# Patient Record
Sex: MALE | Race: WHITE | NOT HISPANIC OR LATINO | Employment: OTHER | URBAN - METROPOLITAN AREA
[De-identification: names, ages, dates, MRNs, and addresses within clinical notes are randomized per-mention and may not be internally consistent; named-entity substitution may affect disease eponyms.]

---

## 2018-09-13 PROBLEM — N23 RENAL COLIC: Status: ACTIVE | Noted: 2018-09-13

## 2018-09-13 PROBLEM — R31.0 GROSS HEMATURIA: Status: ACTIVE | Noted: 2018-09-13

## 2018-09-13 PROBLEM — N20.0 CALCULUS OF KIDNEY: Status: ACTIVE | Noted: 2018-09-13

## 2018-09-13 PROBLEM — N20.1 CALCULUS OF URETER: Status: ACTIVE | Noted: 2018-09-13

## 2020-12-16 ENCOUNTER — HOSPITAL ENCOUNTER (EMERGENCY)
Facility: HOSPITAL | Age: 55
Discharge: HOME | End: 2020-12-16
Attending: EMERGENCY MEDICINE
Payer: OTHER MISCELLANEOUS

## 2020-12-16 VITALS
SYSTOLIC BLOOD PRESSURE: 127 MMHG | RESPIRATION RATE: 20 BRPM | OXYGEN SATURATION: 95 % | TEMPERATURE: 98 F | HEART RATE: 62 BPM | DIASTOLIC BLOOD PRESSURE: 75 MMHG

## 2020-12-16 DIAGNOSIS — S01.511A COMPLICATED LACERATION OF LIP, INITIAL ENCOUNTER: ICD-10-CM

## 2020-12-16 DIAGNOSIS — S03.2XXA TOOTH AVULSION, INITIAL ENCOUNTER: Primary | ICD-10-CM

## 2020-12-16 PROCEDURE — 99285 EMERGENCY DEPT VISIT HI MDM: CPT | Mod: ,,, | Performed by: EMERGENCY MEDICINE

## 2020-12-16 PROCEDURE — 25000003 PHARM REV CODE 250: Performed by: EMERGENCY MEDICINE

## 2020-12-16 PROCEDURE — 99285 PR EMERGENCY DEPT VISIT,LEVEL V: ICD-10-PCS | Mod: ,,, | Performed by: EMERGENCY MEDICINE

## 2020-12-16 PROCEDURE — 13152 CMPLX RPR E/N/E/L 2.6-7.5 CM: CPT

## 2020-12-16 PROCEDURE — 99283 EMERGENCY DEPT VISIT LOW MDM: CPT | Mod: 25

## 2020-12-16 RX ORDER — CHLORHEXIDINE GLUCONATE ORAL RINSE 1.2 MG/ML
15 SOLUTION DENTAL 2 TIMES DAILY
Qty: 420 ML | Refills: 0 | Status: SHIPPED | OUTPATIENT
Start: 2020-12-16 | End: 2020-12-30

## 2020-12-16 RX ORDER — HYDROCODONE BITARTRATE AND ACETAMINOPHEN 5; 325 MG/1; MG/1
1 TABLET ORAL EVERY 4 HOURS PRN
Qty: 18 TABLET | Refills: 0 | Status: SHIPPED | OUTPATIENT
Start: 2020-12-16 | End: 2020-12-19

## 2020-12-16 RX ORDER — HYDROCODONE BITARTRATE AND ACETAMINOPHEN 5; 325 MG/1; MG/1
1 TABLET ORAL EVERY 4 HOURS PRN
Qty: 18 TABLET | Refills: 0 | Status: SHIPPED | OUTPATIENT
Start: 2020-12-16 | End: 2020-12-16 | Stop reason: SDUPTHER

## 2020-12-16 RX ORDER — OXYCODONE AND ACETAMINOPHEN 5; 325 MG/1; MG/1
1 TABLET ORAL
Status: COMPLETED | OUTPATIENT
Start: 2020-12-16 | End: 2020-12-16

## 2020-12-16 RX ORDER — CEPHALEXIN 500 MG/1
500 CAPSULE ORAL 4 TIMES DAILY
Qty: 20 CAPSULE | Refills: 0 | Status: SHIPPED | OUTPATIENT
Start: 2020-12-16 | End: 2020-12-21

## 2020-12-16 RX ORDER — LIDOCAINE HYDROCHLORIDE 10 MG/ML
10 INJECTION INFILTRATION; PERINEURAL
Status: COMPLETED | OUTPATIENT
Start: 2020-12-16 | End: 2020-12-16

## 2020-12-16 RX ORDER — CHLORHEXIDINE GLUCONATE ORAL RINSE 1.2 MG/ML
15 SOLUTION DENTAL 2 TIMES DAILY
Qty: 420 ML | Refills: 0 | Status: SHIPPED | OUTPATIENT
Start: 2020-12-16 | End: 2020-12-16 | Stop reason: SDUPTHER

## 2020-12-16 RX ORDER — BACITRACIN ZINC 500 UNIT/G
OINTMENT (GRAM) TOPICAL 2 TIMES DAILY
Qty: 30 G | Refills: 0 | Status: SHIPPED | OUTPATIENT
Start: 2020-12-16 | End: 2023-08-29

## 2020-12-16 RX ORDER — AMOXICILLIN AND CLAVULANATE POTASSIUM 875; 125 MG/1; MG/1
1 TABLET, FILM COATED ORAL 2 TIMES DAILY
Qty: 14 TABLET | Refills: 0 | Status: SHIPPED | OUTPATIENT
Start: 2020-12-16 | End: 2020-12-16 | Stop reason: CLARIF

## 2020-12-16 RX ADMIN — LIDOCAINE HYDROCHLORIDE 10 ML: 10 INJECTION, SOLUTION INFILTRATION; PERINEURAL at 02:12

## 2020-12-16 RX ADMIN — OXYCODONE HYDROCHLORIDE AND ACETAMINOPHEN 1 TABLET: 5; 325 TABLET ORAL at 04:12

## 2020-12-16 NOTE — ED TRIAGE NOTES
"Patient presents to ER via [facility] from [facility] for plastic surgery evaluation for upper lip laceration. 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 I was sitting down so I didn't fall back but when I stood up my neck and my left shoulder and hip hurt really bad". Denies LOC at time of incident. + neck pains, denies active C-spine tenderness, + left sided hip pains, + left sided shoulder pains. Denies HA. Pt reports active pain 5/10 on pain scale after PO pain mediation from previous facility.  "

## 2020-12-16 NOTE — ED PROVIDER NOTES
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  This happened around 10 30 this morning.  He lost a tooth.  He denies any cough shortness of breath.  He has a headache and some pain to the right side of his neck.  No midline neck pain.  He denies any weakness or numbness to his arms or legs.  There was no loss of consciousness.  He was given a tetanus shot and a Percocet transferred here for Plastic surgery evaluation.        Review of patient's allergies indicates:  No Known Allergies  Past Medical History:   Diagnosis Date    High cholesterol     Hypertension     Kidney stones      Past Surgical History:   Procedure Laterality Date    CYSTOSCOPY W/ RETROGRADES Bilateral 9/13/2018    Procedure: CYSTOSCOPY WITH RETROGRADE PYELOGRAM;  Surgeon: Francisco Pena MD;  Location: [facility] OR;  Service: Urology;  Laterality: Bilateral;    LASER LITHOTRIPSY Left 9/13/2018    Procedure: LITHOTRIPSY-LASER;  Surgeon: Francisco Pena MD;  Location: [facility] OR;  Service: Urology;  Laterality: Left;    URETERAL STENT PLACEMENT Left 9/13/2018    Procedure: INSERTION, STENT, URETER;  Surgeon: Francisco Pena MD;  Location: [facility] OR;  Service: Urology;  Laterality: Left;    URETEROSCOPIC REMOVAL OF URETERIC CALCULUS Left 9/13/2018    Procedure: EXTRACTION-STONE-URETEROSCOPY WITH BASKET;  Surgeon: Francisco Pena MD;  Location: [facility] OR;  Service: Urology;  Laterality: Left;    URETEROSCOPY Left 9/13/2018    Procedure: URETEROSCOPY;  Surgeon: Francisco Pena MD;  Location: [facility] OR;  Service: Urology;  Laterality: Left;      History reviewed. No pertinent family history.  Social History     Tobacco Use    Smoking status: Never Smoker    Smokeless tobacco: Never Used   Substance Use Topics    Alcohol use: Never     Frequency: Never    Drug use: Never     Review of Systems   Constitutional: Negative for fever.   HENT: Negative for congestion.    Respiratory: Negative for shortness of breath.    Cardiovascular: Negative for chest pain.   Gastrointestinal: Negative for abdominal pain.       Physical Exam     Initial Vitals [12/16/20 1342]   BP Pulse Resp Temp SpO2   127/75 (!) 57 16 97.9 °F (36.6 °C) 99 %      MAP       --         Physical Exam    Constitutional: He appears well-developed and well-nourished. He is not diaphoretic. No distress.   HENT:   Right Ear: External ear normal.   Left Ear: External ear normal.   Nose: Nose normal.   Deep laceration (full thickness) to upper lip.  See photo    Dental fracture/avulsion at the gum level to front upper incisor   Eyes: EOM are normal. Pupils are equal, round, and reactive to light.   Neck: Normal range of motion. Neck supple. No JVD present.   Cardiovascular: Normal rate, regular rhythm and normal heart sounds. Exam reveals no friction rub.    Pulmonary/Chest: Breath sounds normal. No respiratory distress. He has no wheezes. He has no rales.   Abdominal: Soft. Bowel sounds are normal. He exhibits no distension. There is no abdominal tenderness.   Musculoskeletal: Normal range of motion.   Neurological: He is alert and oriented to person, place, and time. He has normal strength. No cranial nerve deficit or sensory deficit.             ED Course   Procedures  Labs Reviewed - No data to display       Imaging Results           CT Maxillofacial Without Contrast (Final result)  Result time 12/16/20 15:34:52    Final result by Kamron Segovia MD (12/16/20 15:34:52)                 Impression:      1. Acute midline upper lip open laceration with associated trauma/fracture involving the  1st maxillary tooth left of midline with destruction of the anterior maxilla in this region.  Recommend follow-up.  2. Minimal regularity of the nasal ala on the right could be chronic.  A minimal acute fracture is not completely excluded in this region.  3. Mild left maxillary sinus disease.  4.  This report was flagged in Epic as abnormal.      Electronically signed by: Kamron Cobian  Date:    12/16/2020  Time:    15:34             Narrative:    EXAMINATION:  CT MAXILLOFACIAL WITHOUT CONTRAST    CLINICAL HISTORY:  Facial trauma;    TECHNIQUE:  Low dose axial images, sagittal and coronal reformations were obtained through the face.  Contrast was not administered.    COMPARISON:  None    FINDINGS:  Midline upper lip laceration.  First maxillary tooth left of midline is missing with disruption of the anterior maxilla consistent with acute trauma/fracture.    Minimal irregularity of the nasal ala on the right could be chronic.  A minimal acute fracture is not completely excluded.    Superficial small hyperdense focus at or near the skin surface at the junction of the nose and face on the left could represent small chronic calcification.  Small foreign body is not completely excluded.  Similar though smaller high-density focus is seen slightly more lateral in the left face on axial 181 also.    Probable mucous retention cyst in the left maxillary sinus.  No significant paranasal sinus disease elsewhere.  Globes appear intact.    The orbits are intact.  Zygomatic arches are intact.  The mandible is intact.                               CT Head Without Contrast (Final result)  Result time 12/16/20 15:17:10    Final result by Chiki Montana Jr., MD (12/16/20 15:17:10)                 Impression:      Normal      Electronically signed by: Chiki Baumann Jr  Date:    12/16/2020  Time:    15:17             Narrative:    EXAMINATION:  CT HEAD WITHOUT CONTRAST    CLINICAL HISTORY:  Head trauma,  mod-severe;    TECHNIQUE:  Low dose axial images were obtained through the head.  Coronal and sagittal reformations were also performed. Contrast was not administered.    COMPARISON:  None.    FINDINGS:  The CSF spaces, brain parenchyma, and bony calvarium are intact.There is no evidence of hemorrhage, mass, or acute infarct.    The visualized paranasal sinuses are clear.                                 Medical Decision Making:   History:   Old Records Summarized: records from another hospital.       <> Summary of Records: Patient seen an outside ED and transferred here for further eval.  He got Percocet, tetanus and transferred for plastic  Initial Assessment:   Patient with complex lip laceration.  Will consult Plastic surgery.  Patient with facial trauma.  Will obtain CT.  He will need to be discharged on antibiotic and follow-up with a dentist for his tooth.  Clinical Tests:   Radiological Study: Ordered and Reviewed  ED Management:  CT of face and head reveals no acute fracture except for dental injury.  No sign of head bleed    Plastic surgery fellow and attending repaired laceration    Will discharge home on Keflex as antibiotic.  Peridex swish and spit.  Recommend follow-up with Plastic surgery.  Recommend follow-up with dentist as soon as possible.  Vicodin supply for 3 days.  Other:   I have discussed this case with another health care provider.       <> Summary of the Discussion: Consult discussed with plastics.  Review treatment plan.  They will evaluate                             Clinical Impression:     ICD-10-CM ICD-9-CM   1. Tooth avulsion, initial encounter  S03.2XXA 873.63   2. Complicated laceration of lip, initial encounter  S01.511A 873.53                          ED Disposition Condition    Discharge Stable        ED Prescriptions     Medication Sig Dispense Start Date End Date Auth. Provider    HYDROcodone-acetaminophen (NORCO) 5-325 mg per tablet  (Status: Discontinued) Take 1 tablet by  mouth every 4 (four) hours as needed for Pain. 18 tablet 12/16/2020 12/16/2020 Elías West MD    amoxicillin-clavulanate 875-125mg (AUGMENTIN) 875-125 mg per tablet  (Status: Discontinued) Take 1 tablet by mouth 2 (two) times daily. 14 tablet 12/16/2020 12/16/2020 Elías West MD    chlorhexidine (PERIDEX) 0.12 % solution  (Status: Discontinued) Use as directed 15 mLs in the mouth or throat 2 (two) times daily. for 14 days 420 mL 12/16/2020 12/16/2020 Elías West MD    cephALEXin (KEFLEX) 500 MG capsule Take 1 capsule (500 mg total) by mouth 4 (four) times daily. for 5 days 20 capsule 12/16/2020 12/21/2020 Elías West MD    bacitracin 500 unit/gram Oint Apply topically 2 (two) times daily. 30 g 12/16/2020  Elías West MD    chlorhexidine (PERIDEX) 0.12 % solution Use as directed 15 mLs in the mouth or throat 2 (two) times daily. for 14 days 420 mL 12/16/2020 12/30/2020 Elías West MD    HYDROcodone-acetaminophen (NORCO) 5-325 mg per tablet Take 1 tablet by mouth every 4 (four) hours as needed for Pain. 18 tablet 12/16/2020 12/19/2020 Elías West MD        Follow-up Information     Follow up With Specialties Details Why Contact Info    Dentist as soon as possible        [facility] PLASTIC SURGERY Plastic Surgery Schedule an appointment as soon as possible for a visit   [address] [address]  [phone]    [facility]  If symptoms worsen [address] [id number] [phone]                                       Elías West MD  12/16/20 [id number]

## 2020-12-16 NOTE — ED NOTES
Patient identifiers verified and correct for John Salcido.    LOC: The patient is awake, alert and aware of environment with an appropriate affect, the patient is oriented x 3 and speaking appropriately.  APPEARANCE: Patient resting comfortably and in no acute distress, patient is clean and well groomed, patient's clothing is properly fastened.   SKIN: The skin is warm and dry, color consistent with ethnicity, patient has normal skin turgor and moist mucus membranes, skin intact, no breakdown or bruising noted. + vertical laceration noted to upper lip, Bleeding controlled at this time.   MUSCULOSKELETAL: Patient moving all extremities spontaneously, no obvious swelling or deformities noted. Pt reports + left sided shoulder pains, + left sided hip pains, + neck pains, denies C-spine tenderness.   RESPIRATORY: Airway is open and patent, respirations are spontaneous, patient has a normal effort and rate, no accessory muscle use noted, bilateral breath sounds clear in all lobes.  CARDIAC: Patient has a normal rate and regular rhythm, no periphreal edema noted, capillary refill < 3 seconds.  ABDOMEN: Soft and non tender to palpation, no distention noted.  NEUROLOGIC: PERRL, 3 mm bilaterally, eyes open spontaneously, behavior appropriate to situation, follows commands, facial expression symmetrical, bilateral hand grasp equal and even, purposeful motor response noted, normal sensation in all extremities when touched with a finger.

## 2020-12-16 NOTE — ED NOTES
Patient moved to ED room 29 via [facility] from [facility] for plastic surgery consult, patient assisted onto stretcher and changed into a gown. Patient placed on cardiac monitor, continuous pulse oximetry and automatic blood pressure cuff. Bed placed in low locked position, side rails up x 2, call light is within reach of patient or family, orientation to room and explanation of wait provided to family and patient, alarms set and turned on for monitor and pulse ox, awaiting MD evaluation and orders, will continue to monitor.

## 2020-12-16 NOTE — ED NOTES
Pt requesting pain medication at this time, pain currently rated 7/10 on pain scale. MD made aware, awaiting further orders at this time.

## 2020-12-16 NOTE — CONSULTS
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 Patient reports loosing a teeth.  He denies any cough shortness of breath.  He has a headache and some pain to the right side of his neck.  No midline neck pain.  He denies any weakness or numbness to his arms or legs.  There was no loss of consciousness.     Current Facility-Administered Medications on File Prior to Encounter   Medication    [COMPLETED] oxyCODONE-acetaminophen 5-325 mg per tablet 1 tablet    [COMPLETED] Tdap vaccine injection 0.5 mL     Current Outpatient Medications on File Prior to Encounter   Medication Sig    allopurinol (ZYLOPRIM) 300 MG tablet Take 300 mg by mouth once daily.    metoprolol succinate (TOPROL-XL) 50 MG 24 hr tablet Take 50 mg by mouth 2 (two) times daily.    oxybutynin (DITROPAN) 5 MG Tab Take 5 mg by mouth 2 (two) times daily.    phenazopyridine (PYRIDIUM) 200 MG tablet Take 200 mg by mouth 3 (three) times daily with meals.    simvastatin (ZOCOR) 20 MG tablet Take 20 mg by mouth once daily.    tamsulosin (FLOMAX) 0.4 mg Cap Take 1 capsule (0.4 mg total) by mouth once daily.    [DISCONTINUED] HYDROcodone-acetaminophen (NORCO)  mg per tablet Take 1 tablet by mouth every 6 (six) hours as needed for Pain.    [DISCONTINUED] ondansetron (ZOFRAN-ODT) 4 MG TbDL TAKE 1 TABLET EVERY 6 HRS AS NEEDED FOR NAUSEA/VOMITING       Review of patient's allergies indicates:  No  Known Allergies    Past Medical History:   Diagnosis Date    High cholesterol     Hypertension     Kidney stones      Past Surgical History:   Procedure Laterality Date    CYSTOSCOPY W/ RETROGRADES Bilateral 9/13/2018    Procedure: CYSTOSCOPY WITH RETROGRADE PYELOGRAM;  Surgeon: Francisco Pena MD;  Location: [facility] OR;  Service: Urology;  Laterality: Bilateral;    LASER LITHOTRIPSY Left 9/13/2018    Procedure: LITHOTRIPSY-LASER;  Surgeon: Francisco Pena MD;  Location: [facility] OR;  Service: Urology;  Laterality: Left;    URETERAL STENT PLACEMENT Left 9/13/2018    Procedure: INSERTION, STENT, URETER;  Surgeon: Francisco Pena MD;  Location: [facility] OR;  Service: Urology;  Laterality: Left;    URETEROSCOPIC REMOVAL OF URETERIC CALCULUS Left 9/13/2018    Procedure: EXTRACTION-STONE-URETEROSCOPY WITH BASKET;  Surgeon: Francisco Pena MD;  Location: [facility] OR;  Service: Urology;  Laterality: Left;    URETEROSCOPY Left 9/13/2018    Procedure: URETEROSCOPY;  Surgeon: Francisco Pena MD;  Location: [facility] OR;  Service: Urology;  Laterality: Left;     Family History     None        Tobacco Use    Smoking status: Never Smoker    Smokeless tobacco: Never Used   Substance and Sexual Activity    Alcohol use: Never     Frequency: Never    Drug use: Never    Sexual activity: Not on file     Review of Systems   Constitutional: Negative for chills and fever.   HENT: Positive for dental problem. Negative for nosebleeds.         Upper lip laceration through and through   Eyes: Positive for pain.   Respiratory: Negative for chest tightness and shortness of breath.    Gastrointestinal: Negative for abdominal distention.   Genitourinary: Negative for flank pain.   Musculoskeletal: Negative for back pain, myalgias and neck pain.   Neurological: Positive for numbness.   Psychiatric/Behavioral: Negative for agitation and sleep disturbance.     Objective:     Vital Signs (Most Recent):  Temp: 97.9 °F (36.6 °C) (12/16/20 1342)  Pulse:  62 (12/16/20 1535)  Resp: 20 (12/16/20 1622)  BP: 127/75 (12/16/20 1342)  SpO2: 95 % (12/16/20 1535) Vital Signs (24h Range):  Temp:  [97.5 °F (36.4 °C)-97.9 °F (36.6 °C)] 97.9 °F (36.6 °C)  Pulse:  [57-78] 62  Resp:  [16-21] 20  SpO2:  [94 %-99 %] 95 %  BP: (127-164)/(75-93) 127/75        There is no height or weight on file to calculate BMI.    No intake or output data in the 24 hours ending 12/16/20 1648    Physical Exam  Constitutional:       Appearance: Normal appearance.   HENT:      Mouth/Throat:      Comments: Upper lip midline through and through laceration  Eyes:      Conjunctiva/sclera: Conjunctivae normal.   Neck:      Musculoskeletal: Neck supple.   Cardiovascular:      Rate and Rhythm: Regular rhythm.   Pulmonary:      Breath sounds: Normal breath sounds.   Abdominal:      General: Abdomen is flat.      Palpations: Abdomen is soft.   Musculoskeletal: Normal range of motion.   Skin:     General: Skin is warm and dry.   Neurological:      Mental Status: He is alert and oriented to person, place, and time.     Significant Labs:  BMP: No results for input(s): GLU, NA, K, CL, CO2, BUN, CREATININE, CALCIUM, MG in the last 48 hours.  CBC: No results for input(s): WBC, RBC, HGB, HCT, PLT, MCV, MCH, MCHC in the last 48 hours.    Significant Diagnostics:  CT: I have reviewed all pertinent results/findings within the past 24 hours.   Head - Normal  Face - Acute midline upper lip open laceration with associated trauma/fracture involving the 1st maxillary tooth left of midline with destruction of the anterior maxilla in this region.  Recommend follow-up.    Assessment/Plan:     55 year old M presenting with through and through laceration      -Bacitracin to upper lip three times a day  -Peridex swish and spit twice a day  -Continue home doxy course  -Follow up with Dr santos as needed      Laceration Repair Procedure Note    Pre-operative Diagnosis: 6 cm upper lip laceration    Post-operative Diagnosis:  normal    Indications: upper lip laceration through and through    Anesthesia: 1% plain lidocaine    Procedure Details   6 cm upper lip laceration through and through, washed out with betadine and saline. Upper lip lacerated in three pieces, with the middle one being only connected by a orbicularis edge. Orbicularis was approximated using a 4-0 vicryl. We then alligned the skin using 5-0 chromic.    Findings:  Wound edge debrieded to bleeding edges    EBL: 3 cc's    Drains: none    Condition:  Stable    Complications:  none.    Thank you for your consult. I will follow-up with patient. Please contact us if you have any additional questions.    Jan Zheng MD  Plastic Surgery  [facility]

## 2020-12-16 NOTE — ED NOTES
Procedure consents signed per plastics MD at bedside, signed per patient, labeled, witnessed and placed in pt's chart

## 2020-12-21 ENCOUNTER — TELEPHONE (OUTPATIENT)
Dept: PLASTIC SURGERY | Facility: CLINIC | Age: 55
End: 2020-12-21

## 2020-12-21 ENCOUNTER — OFFICE VISIT (OUTPATIENT)
Dept: PLASTIC SURGERY | Facility: CLINIC | Age: 55
End: 2020-12-21
Payer: COMMERCIAL

## 2020-12-21 DIAGNOSIS — S01.81XD FACIAL LACERATION, SUBSEQUENT ENCOUNTER: Primary | ICD-10-CM

## 2020-12-21 PROCEDURE — 99024 POSTOP FOLLOW-UP VISIT: CPT | Mod: 95,,, | Performed by: SURGERY

## 2020-12-21 PROCEDURE — 99024 PR POST-OP FOLLOW-UP VISIT: ICD-10-PCS | Mod: 95,,, | Performed by: SURGERY

## 2020-12-21 NOTE — PROGRESS NOTES
Established Patient - Audio Only Telehealth Visit     The patient location is: home  The chief complaint leading to consultation is: follow up facial laceration  Visit type: Virtual visit with audio only (telephone)  Total time spent with patient: 10 minutes       The reason for the audio only service rather than synchronous audio and video virtual visit was related to technical difficulties or patient preference/necessity.     Each patient to whom I provide medical services by telemedicine is:  (1) informed of the relationship between the physician and patient and the respective role of any other health care provider with respect to management of the patient; and (2) notified that they may decline to receive medical services by telemedicine and may withdraw from such care at any time. Patient verbally consented to receive this service via voice-only telephone call.       HPI:   Lip laceration follow up with large, nearly totally amputated lip tubercle segment     There is a 2-3 mm segment of thin eschar over the left aspect of the lip tubercle.    There is no dehiscence.  There is an intraoral step off at the mucosal side of the upper lip.      Assessment and plan:    Continue peridex rinses  Monitor for signs of foul drainage, dehiscence or other evidence of infection  Please follow up in 1 week.       This service was not originating from a related E/M service provided within the previous 7 days nor will  to an E/M service or procedure within the next 24 hours or my soonest available appointment.  Prevailing standard of care was able to be met in this audio-only visit.

## 2020-12-21 NOTE — TELEPHONE ENCOUNTER
----- Message from Karlie Hsu sent at 12/21/2020  9:46 AM CST -----  Regarding: speak with nurse  Contact: patient  [phone]    please call above patient was told to call and get a virtual visit scheduled for today waiting on a call back from the nurse thanks.

## 2020-12-28 ENCOUNTER — OFFICE VISIT (OUTPATIENT)
Dept: PLASTIC SURGERY | Facility: CLINIC | Age: 55
End: 2020-12-28
Payer: COMMERCIAL

## 2020-12-28 DIAGNOSIS — S01.81XD FACIAL LACERATION, SUBSEQUENT ENCOUNTER: Primary | ICD-10-CM

## 2020-12-28 PROCEDURE — 99024 POSTOP FOLLOW-UP VISIT: CPT | Mod: 95,,, | Performed by: SURGERY

## 2020-12-28 PROCEDURE — 99024 PR POST-OP FOLLOW-UP VISIT: ICD-10-PCS | Mod: 95,,, | Performed by: SURGERY

## 2020-12-28 NOTE — PROGRESS NOTES
Established Patient - Audio Only Telehealth Visit     The patient location is: Home  The chief complaint leading to consultation is: lip laceration repair  Visit type: Virtual visit with audio only (telephone)  Total time spent with patient: 10 min       The reason for the audio only service rather than synchronous audio and video virtual visit was related to technical difficulties or patient preference/necessity.     Each patient to whom I provide medical services by telemedicine is:  (1) informed of the relationship between the physician and patient and the respective role of any other health care provider with respect to management of the patient; and (2) notified that they may decline to receive medical services by telemedicine and may withdraw from such care at any time. Patient verbally consented to receive this service via voice-only telephone call.       HPI: History of lip laceration.  #9 was lost in the accident.  He has had a consultation with a dentist who plans on bridge, implant and crown.  They are waiting for bone to heal better    There is a pin head sized eschar over his left paramedian tubercle  Laceration well healed  Red lip, white roll well approximated     Assessment and plan:    Scar massage  Ongoing dental care  Scar care- high SPF sunscreen  Continue peridex rinses.       This service was not originating from a related E/M service provided within the previous 7 days nor will  to an E/M service or procedure within the next 24 hours or my soonest available appointment.  Prevailing standard of care was able to be met in this audio-only visit.      Plastic & Reconstructive Surgery  [facility]  c/o Venkat Osborne M.D.  [facility]  [address]  [address] [place] [address]    Work [phone]  Toll free [phone]  If no answer [phone]

## 2021-05-04 ENCOUNTER — PATIENT MESSAGE (OUTPATIENT)
Dept: RESEARCH | Facility: HOSPITAL | Age: 56
End: 2021-05-04

## 2023-07-05 NOTE — PROGRESS NOTES
DATE: 7/10/2023  PATIENT: John Salcido    Supervising Physician: Josh Peterson M.D.    CHIEF COMPLAINT: neck and bilateral arm pain    HISTORY:  John Salcido is a 57 y.o. male here for initial evaluation of neck and bilateral (R>L) arm pain (Neck - 3, Arm - 3).  The pain has been present for 3 years after an accident at work. 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 The patient describes the pain as aching in his neck and shooting down both arms (R>L). The pain is worse with activity and improved by nothing. There is positive associated numbness and tingling. There is positive subjective weakness. Prior treatments have included PT, chiropractic rx, ESIs and MBBs, but no surgery.     The patient also has complaints of low back and bilateral (L>R) leg pain (Back - 3, Leg - 3). The pain has been present for years. The patient describes the pain as severe pain in his left calf and radiating pain in both thighs.  The pain is worse with activity and improved by rest. There is positive associated numbness and tingling. There is negative subjective weakness. Prior treatments have included PT, but no ESIs or surgery. Pt says years ago he had a lumbar MRI that showed an schwannoma at L2 and he is supposed to monitor it but has not had a recent lumbar MRI.    The patient denies myelopathic symptoms such as handwriting changes or difficulty with buttons/coins/keys. Denies perineal paresthesias, bowel/bladder dysfunction.    PAST MEDICAL/SURGICAL HISTORY:  Past Medical History:   Diagnosis Date    High cholesterol     Hypertension     Kidney stones      Past Surgical History:   Procedure Laterality Date    CYSTOSCOPY W/ RETROGRADES Bilateral 9/13/2018    Procedure: CYSTOSCOPY WITH RETROGRADE PYELOGRAM;  Surgeon: Francisco Pena MD;  Location: [facility] OR;  Service: Urology;  Laterality: Bilateral;    LASER LITHOTRIPSY Left 9/13/2018    Procedure: LITHOTRIPSY-LASER;  Surgeon: Francisco MCGILL  MD Jean;  Location: [facility] OR;  Service: Urology;  Laterality: Left;    URETERAL STENT PLACEMENT Left 9/13/2018    Procedure: INSERTION, STENT, URETER;  Surgeon: Francisco Pena MD;  Location: [facility] OR;  Service: Urology;  Laterality: Left;    URETEROSCOPIC REMOVAL OF URETERIC CALCULUS Left 9/13/2018    Procedure: EXTRACTION-STONE-URETEROSCOPY WITH BASKET;  Surgeon: Francisco Pena MD;  Location: [facility] OR;  Service: Urology;  Laterality: Left;    URETEROSCOPY Left 9/13/2018    Procedure: URETEROSCOPY;  Surgeon: Francisco Pena MD;  Location: [facility] OR;  Service: Urology;  Laterality: Left;       Medications:   Current Outpatient Medications on File Prior to Visit   Medication Sig Dispense Refill    allopurinol (ZYLOPRIM) 300 MG tablet Take 300 mg by mouth once daily.      bacitracin 500 unit/gram Oint Apply topically 2 (two) times daily. 30 g 0    metoprolol succinate (TOPROL-XL) 50 MG 24 hr tablet Take 50 mg by mouth 2 (two) times daily.      oxybutynin (DITROPAN) 5 MG Tab Take 5 mg by mouth 2 (two) times daily.      phenazopyridine (PYRIDIUM) 200 MG tablet Take 200 mg by mouth 3 (three) times daily with meals.      simvastatin (ZOCOR) 20 MG tablet Take 20 mg by mouth once daily.      tamsulosin (FLOMAX) 0.4 mg Cap Take 1 capsule (0.4 mg total) by mouth once daily. 10 capsule 0     No current facility-administered medications on file prior to visit.       Social History:   Social History     Socioeconomic History    Marital status:    Tobacco Use    Smoking status: Never    Smokeless tobacco: Never   Substance and Sexual Activity    Alcohol use: Never    Drug use: Never       REVIEW OF SYSTEMS:  Constitution: Negative. Negative for chills, fever and night sweats.   Cardiovascular: Negative for chest pain and syncope.   Respiratory: Negative for cough and shortness of breath.   Gastrointestinal: See HPI. Negative for nausea/vomiting. Negative for abdominal pain.  Genitourinary: See HPI. Negative for  "discoloration or dysuria.  Skin: Negative for dry skin, itching and rash.   Hematologic/Lymphatic: Negative for bleeding problem. Does not bruise/bleed easily.   Musculoskeletal: Negative for falls and muscle weakness.   Neurological: See HPI. No seizures.   Endocrine: Negative for polydipsia, polyphagia and polyuria.   Allergic/Immunologic: Negative for hives and persistent infections.     EXAM:  Ht 5' 8" (1.727 m)   Wt 95.3 kg (210 lb 1.6 oz)   BMI 31.95 kg/m²     PHYSICAL EXAMINATION:    General: The patient is a very pleasant 57 y.o. male in no apparent distress, the patient is oriented to person, place and time.  Psych: Normal mood and affect  HEENT: Vision grossly intact, hearing intact to the spoken word.  Lungs: Respirations unlabored.  Gait: Normal station and gait, no difficulty with toe or heel walk.   Skin: Cervical skin and dorsal lumbar skin negative for rashes, lesions, hairy patches and surgical scars.    Range of motion: Cervical and lumbar range of motion is acceptable. There is mild tenderness to palpation of the paracervical muscles.  There is mild lumbar tenderness to palpation.  Spinal Balance: Global saggital and coronal spinal balance acceptable, no significant for scoliosis and kyphosis.  Musculoskeletal: No pain with the range of motion of the bilateral shoulders and elbows. Normal bulk and contour of the bilateral hands.  No pain with the range of motion of the bilateral hips. Mild bilateral trochanteric tenderness to palpation.  Vascular: Bilateral upper and lower extremities warm and well perfused, radial pulses 2+ bilaterally, dorsalis pedis pulses 2+ bilaterally.  Neurological: Normal strength and tone in all major motor groups in the bilateral upper and lower extremities. Normal sensation to light touch in the C5-T1 and L2-S1 dermatomes bilaterally.  Deep tendon reflexes symmetric 2+ in the bilateral upper and lower extremities.  Negative Inverted Radial Reflex and Vaughn's " bilaterally. Negative Babinski bilaterally. Negative straight leg raise bilaterally.     IMAGING:   Today I personally reviewed AP, Lat and Flex/Ex  upright C-spine films that demonstrate degenerative changes at C5-6.    MRI cervical (2020 outside facility) demonstrates mild bulging disc at C5-6      Body mass index is 31.95 kg/m².    No results found for: HGBA1C      ASSESSMENT/PLAN:    Diagnoses and all orders for this visit:    Localized swelling, mass and lump, unspecified  -     MRI Lumbar Spine W WO Contrast; Future    Cervical radiculopathy  -     MRI Cervical Spine Without Contrast; Future    Other orders  -     methylPREDNISolone (MEDROL DOSEPACK) 4 mg tablet; use as directed        Today we discussed at length all of the different treatment options including anti-inflammatories, acetaminophen, rest, ice, heat, physical therapy including strengthening and stretching exercises, home exercises, ROM, aerobic conditioning, aqua therapy, other modalities including ultrasound, massage, and dry needling, epidural steroid injections and finally surgical intervention.      Pt presents with chronic worsening neck pain and cervical radiculopathy. Failure of conservative rx. Will obtain MRI to further evaluate and call with results.    Also presents with chronic lumbar radiculopathy and hx of L2 schwannoma. Will obtain MRI to monitor.

## 2023-07-07 ENCOUNTER — HOSPITAL ENCOUNTER (OUTPATIENT)
Dept: RADIOLOGY | Facility: HOSPITAL | Age: 58
Discharge: HOME | End: 2023-07-07
Attending: ORTHOPAEDIC SURGERY
Payer: COMMERCIAL

## 2023-07-07 ENCOUNTER — OFFICE VISIT (OUTPATIENT)
Dept: ORTHOPEDICS | Facility: CLINIC | Age: 58
End: 2023-07-07
Payer: COMMERCIAL

## 2023-07-07 VITALS — BODY MASS INDEX: 31.85 KG/M2 | HEIGHT: 68 IN | WEIGHT: 210.13 LBS

## 2023-07-07 DIAGNOSIS — M54.12 CERVICAL RADICULOPATHY: ICD-10-CM

## 2023-07-07 DIAGNOSIS — R22.9 LOCALIZED SWELLING, MASS AND LUMP, UNSPECIFIED: Primary | ICD-10-CM

## 2023-07-07 DIAGNOSIS — M50.30 DDD (DEGENERATIVE DISC DISEASE), CERVICAL: ICD-10-CM

## 2023-07-07 PROCEDURE — 3008F BODY MASS INDEX DOCD: CPT | Mod: CPTII,S$GLB,, | Performed by: ORTHOPAEDIC SURGERY

## 2023-07-07 PROCEDURE — 3008F PR BODY MASS INDEX (BMI) DOCUMENTED: ICD-10-PCS | Mod: CPTII,S$GLB,, | Performed by: ORTHOPAEDIC SURGERY

## 2023-07-07 PROCEDURE — 1159F PR MEDICATION LIST DOCUMENTED IN MEDICAL RECORD: ICD-10-PCS | Mod: CPTII,S$GLB,, | Performed by: ORTHOPAEDIC SURGERY

## 2023-07-07 PROCEDURE — 1159F MED LIST DOCD IN RCRD: CPT | Mod: CPTII,S$GLB,, | Performed by: ORTHOPAEDIC SURGERY

## 2023-07-07 PROCEDURE — 99999 PR PBB SHADOW E&M-EST. PATIENT-LVL III: ICD-10-PCS | Mod: PBBFAC,,, | Performed by: ORTHOPAEDIC SURGERY

## 2023-07-07 PROCEDURE — 72050 X-RAY EXAM NECK SPINE 4/5VWS: CPT | Mod: TC

## 2023-07-07 PROCEDURE — 99999 PR PBB SHADOW E&M-EST. PATIENT-LVL III: CPT | Mod: PBBFAC,,, | Performed by: ORTHOPAEDIC SURGERY

## 2023-07-07 PROCEDURE — 72050 X-RAY EXAM NECK SPINE 4/5VWS: CPT | Mod: 26,,, | Performed by: RADIOLOGY

## 2023-07-07 PROCEDURE — 99204 OFFICE O/P NEW MOD 45 MIN: CPT | Mod: S$GLB,,, | Performed by: ORTHOPAEDIC SURGERY

## 2023-07-07 PROCEDURE — 99204 PR OFFICE/OUTPT VISIT, NEW, LEVL IV, 45-59 MIN: ICD-10-PCS | Mod: S$GLB,,, | Performed by: ORTHOPAEDIC SURGERY

## 2023-07-07 PROCEDURE — 72050 XR CERVICAL SPINE AP LAT WITH FLEX EXTEN: ICD-10-PCS | Mod: 26,,, | Performed by: RADIOLOGY

## 2023-07-07 RX ORDER — METHYLPREDNISOLONE 4 MG/1
TABLET ORAL
Qty: 1 EACH | Refills: 0 | Status: SHIPPED | OUTPATIENT
Start: 2023-07-07 | End: 2023-07-28

## 2023-07-14 ENCOUNTER — HOSPITAL ENCOUNTER (OUTPATIENT)
Dept: RADIOLOGY | Facility: HOSPITAL | Age: 58
Discharge: HOME | End: 2023-07-14
Attending: ORTHOPAEDIC SURGERY
Payer: COMMERCIAL

## 2023-07-14 DIAGNOSIS — M54.12 CERVICAL RADICULOPATHY: ICD-10-CM

## 2023-07-14 DIAGNOSIS — R22.9 LOCALIZED SWELLING, MASS AND LUMP, UNSPECIFIED: ICD-10-CM

## 2023-07-14 PROCEDURE — 72141 MRI CERVICAL SPINE WITHOUT CONTRAST: ICD-10-PCS | Mod: 26,,, | Performed by: INTERNAL MEDICINE

## 2023-07-14 PROCEDURE — A9585 GADOBUTROL INJECTION: HCPCS | Performed by: ORTHOPAEDIC SURGERY

## 2023-07-14 PROCEDURE — 72141 MRI NECK SPINE W/O DYE: CPT | Mod: TC

## 2023-07-14 PROCEDURE — 72158 MRI LUMBAR SPINE W WO CONTRAST: ICD-10-PCS | Mod: 26,,, | Performed by: INTERNAL MEDICINE

## 2023-07-14 PROCEDURE — 25500020 PHARM REV CODE 255: Performed by: ORTHOPAEDIC SURGERY

## 2023-07-14 PROCEDURE — 72158 MRI LUMBAR SPINE W/O & W/DYE: CPT | Mod: 26,,, | Performed by: INTERNAL MEDICINE

## 2023-07-14 PROCEDURE — 72141 MRI NECK SPINE W/O DYE: CPT | Mod: 26,,, | Performed by: INTERNAL MEDICINE

## 2023-07-14 PROCEDURE — 72158 MRI LUMBAR SPINE W/O & W/DYE: CPT | Mod: TC

## 2023-07-14 RX ORDER — GADOBUTROL 604.72 MG/ML
9 INJECTION INTRAVENOUS
Status: COMPLETED | OUTPATIENT
Start: 2023-07-14 | End: 2023-07-14

## 2023-07-14 RX ADMIN — GADOBUTROL 9 ML: 604.72 INJECTION INTRAVENOUS at 10:07

## 2023-07-25 NOTE — PROGRESS NOTES
Established Patient - Audio Only Telehealth Visit     The patient location is: home  The chief complaint leading to consultation is: MRI results  Visit type: Virtual visit with audio only (telephone)  Total time spent with patient: 10 min       The reason for the audio only service rather than synchronous audio and video virtual visit was related to technical difficulties or patient preference/necessity.     Each patient to whom I provide medical services by telemedicine is:  (1) informed of the relationship between the physician and patient and the respective role of any other health care provider with respect to management of the patient; and (2) notified that they may decline to receive medical services by telemedicine and may withdraw from such care at any time. Patient verbally consented to receive this service via voice-only telephone call.    DATE: 7/25/2023  PATIENT: John Salcido    Attending Physician: Josh Peterson MD    HISTORY:  John Salcido is a 57 y.o. male who returns to me today for MRI results.  He was last seen by me 7/7/2023.  Today he is doing well but notes he continues to have neck, bilateral arm, low back, and bilateral leg pain.    The Patient denies myelopathic symptoms such as handwriting changes or difficulty with buttons/coins/keys. Denies perineal paresthesias, bowel/bladder dysfunction.    PMH/PSH/FamHx/SocHx:  Unchanged from prior visit    ROS:  REVIEW OF SYSTEMS:  Constitution: Negative. Negative for chills, fever and night sweats.   HENT: Negative for congestion and headaches.    Eyes: Negative for blurred vision, left vision loss and right vision loss.   Cardiovascular: Negative for chest pain and syncope.   Respiratory: Negative for cough and shortness of breath.    Endocrine: Negative for polydipsia, polyphagia and polyuria.   Hematologic/Lymphatic: Negative for bleeding problem. Does not bruise/bleed easily.   Skin: Negative for dry skin, itching and rash.   Musculoskeletal:  Negative for falls and muscle weakness.   Gastrointestinal: Negative for abdominal pain and bowel incontinence.   Allergic/Immunologic: Negative for hives and persistent infections.  Genitourinary: Negative for urinary retention/incontinence and nocturia.   Neurological: negative for disturbances in coordination, no myelopathic symptoms such as handwriting changes or difficulty with buttons, coins, keys or small objects. No loss of balance and seizures.   Psychiatric/Behavioral: Negative for depression. The patient does not have insomnia.   Denies perineal paresthesias, bowel or bladder incontinence    EXAM:  There were no vitals taken for this visit.    My physical examination was notable for the following findings:     Musculoskeletal and neuro exam stable      IMAGING:    Today I personally re- reviewed AP, Lat and Flex/Ex  upright C-spine films that demonstrate degenerative changes at C5-6.    MRI cervical demonstrates mild disc osteophyte complex at C5-6.    MRI lumbar demonstrates unchanged 1.2 x 0.8 x 0.6 cm intradural lesion at the L2 level (8:10 and 7:22).  No convincing enhancement. Stability favors a benign process such as a myxopapillary ependymoma, schwannoma, or meningioma. Multilevel degenerative changes, most advanced at L4-5 where there is degenerative grade 1 anterolisthesis resulting in mild canal stenosis and moderate bilateral foraminal stenosis.    There is no height or weight on file to calculate BMI.    No results found for: HGBA1C      ASSESSMENT/PLAN:    There are no diagnoses linked to this encounter.    Today we discussed at length all of the different treatment options including anti-inflammatories, acetaminophen, rest, ice, heat, physical therapy including strengthening and stretching exercises, home exercises, ROM, aerobic conditioning, aqua therapy, other modalities including ultrasound, massage, and dry needling, epidural steroid injections and finally surgical intervention.      Pt  presents with chronic cervical and lumbar radiculopathy. Will obtain EMG BUE to further evaluate and call with results.                        This service was not originating from a related E/M service provided within the previous 7 days nor will  to an E/M service or procedure within the next 24 hours or my soonest available appointment.  Prevailing standard of care was able to be met in this audio-only visit.

## 2023-07-27 ENCOUNTER — OFFICE VISIT (OUTPATIENT)
Dept: ORTHOPEDICS | Facility: CLINIC | Age: 58
End: 2023-07-27
Payer: COMMERCIAL

## 2023-07-27 ENCOUNTER — PATIENT MESSAGE (OUTPATIENT)
Dept: ORTHOPEDICS | Facility: CLINIC | Age: 58
End: 2023-07-27

## 2023-07-27 DIAGNOSIS — M54.12 CERVICAL RADICULOPATHY: Primary | ICD-10-CM

## 2023-07-27 PROCEDURE — 99213 OFFICE O/P EST LOW 20 MIN: CPT | Mod: 95,,, | Performed by: ORTHOPAEDIC SURGERY

## 2023-07-27 PROCEDURE — 99213 PR OFFICE/OUTPT VISIT, EST, LEVL III, 20-29 MIN: ICD-10-PCS | Mod: 95,,, | Performed by: ORTHOPAEDIC SURGERY

## 2023-08-01 ENCOUNTER — PROCEDURE VISIT (OUTPATIENT)
Dept: NEUROLOGY | Facility: CLINIC | Age: 58
End: 2023-08-01
Payer: COMMERCIAL

## 2023-08-01 DIAGNOSIS — M79.602 PAIN IN BOTH UPPER EXTREMITIES: Primary | ICD-10-CM

## 2023-08-01 DIAGNOSIS — M79.601 PAIN IN BOTH UPPER EXTREMITIES: Primary | ICD-10-CM

## 2023-08-01 DIAGNOSIS — M54.12 CERVICAL RADICULOPATHY: ICD-10-CM

## 2023-08-01 DIAGNOSIS — R20.0 NUMBNESS OF UPPER LIMB: ICD-10-CM

## 2023-08-01 PROCEDURE — 99203 PR OFFICE/OUTPT VISIT, NEW, LEVL III, 30-44 MIN: ICD-10-PCS | Mod: 25,S$GLB,, | Performed by: PSYCHIATRY & NEUROLOGY

## 2023-08-01 PROCEDURE — 95886 MUSC TEST DONE W/N TEST COMP: CPT | Mod: S$GLB,,, | Performed by: PSYCHIATRY & NEUROLOGY

## 2023-08-01 PROCEDURE — 95913 NRV CNDJ TEST 13/> STUDIES: CPT | Mod: S$GLB,,, | Performed by: PSYCHIATRY & NEUROLOGY

## 2023-08-01 PROCEDURE — 99203 OFFICE O/P NEW LOW 30 MIN: CPT | Mod: 25,S$GLB,, | Performed by: PSYCHIATRY & NEUROLOGY

## 2023-08-01 PROCEDURE — 95913 PR NERVE CONDUCTION STUDY; 13 OR MORE STUDIES: ICD-10-PCS | Mod: S$GLB,,, | Performed by: PSYCHIATRY & NEUROLOGY

## 2023-08-01 PROCEDURE — 95886 PR EMG COMPLETE, W/ NERVE CONDUCTION STUDIES, 5+ MUSCLES: ICD-10-PCS | Mod: S$GLB,,, | Performed by: PSYCHIATRY & NEUROLOGY

## 2023-08-01 NOTE — PROGRESS NOTES
[facility] [address] Neurology    Subjective:       Patient ID: John Salcido is a 57 y.o. male here for a EMG focused evaluation for neck and arm pain. Previous visits and diagnostic evaluation reviewed.  Patient states he was involved in an accident approximately 3 years ago with subsequent neck pain.  Neck pain radiating down bilateral arms.  He did begin to experience numbness of bilateral hands.  Symptoms have been slightly improved over the past few months.   HPI  Review of patient's allergies indicates:  No Known Allergies   There were no vitals filed for this visit.   Chief Complaint: No chief complaint on file.    Past Medical History:   Diagnosis Date    High cholesterol     Hypertension     Kidney stones       Social History     Socioeconomic History    Marital status:    Tobacco Use    Smoking status: Never    Smokeless tobacco: Never   Substance and Sexual Activity    Alcohol use: Never    Drug use: Never      Review of Systems:   No Fever  No SOB  No vomiting  No visual disturbance      Objective:      Physical Exam    Constitutional: Patient appears well-nourished.   Head: Normocephalic and atraumatic.   Mouth/Throat: Oropharynx is clear and moist.   Pulmonary/Chest: Effort normal.   Abdominal: Soft.   Skin: Skin is warm and dry.      General:  Patient is alert and cooperative.  Affect:  Patient is appropriate to surroundings and environment.  Language:  Speech is fluent.  HEENT:  There are no outward signs of trauma to head or face.  Cranial Nerves:  Pupils are equal round and reactive to light. Extra-ocular movements are intact. Face, tongue, and palate are symmetrical.  Motor:  Patient exhibits normal strength testing in bilateral proximal and distal upper extremities.  Reflexes:  Symmetrical in bilateral upper extremities.  Gait:  Ambulation is independent without use of cane or walker without signs of ataxia or  circumduction.  Cerebellar:  Normal finger to nose testing without dysmetria.  Sensory:  Intact to sensory modalities tested.  Musculoskeletal:  There is no severe tenderness to palpation and manipulation of the cervical spine region.   Assessment:       We reviewed and discussed at length results of EMG performed today revealing moderate to severe bilateral carpal tunnel syndrome worse on the right without significant evidence of cervical radiculopathy. These findings are available via media section of chart review.   1. Pain in both upper extremities    2. Cervical radiculopathy    3. Numbness of upper limb              Plan:       We discussed treatment options at length. Recommend patient keep appointment with referring provider.       We specifically discussed the pathophysiology of carpal tunnel syndrome and treatment options including bracing, injections and possible surgical intervention.     I spent a total of 35 minutes on the day of the visit. This includes face to face time and non-face to face time preparing to see the patient (eg, review of tests), obtaining and/or reviewing separately obtained history, documenting clinical information in the electronic or other health record, independently interpreting results and communicating results to the patient/family/caregiver, or care coordinator  .  Shay Piper MD, FAAN   08/01/2023   11:35 AM     A dictation device was used to produce this document. Use of such devices sometimes results in grammatical errors or replacement of words that sound similarly.

## 2023-08-17 ENCOUNTER — OFFICE VISIT (OUTPATIENT)
Dept: ORTHOPEDICS | Facility: CLINIC | Age: 58
End: 2023-08-17
Payer: COMMERCIAL

## 2023-08-17 DIAGNOSIS — M51.36 DDD (DEGENERATIVE DISC DISEASE), LUMBAR: ICD-10-CM

## 2023-08-17 DIAGNOSIS — M54.12 CERVICAL RADICULOPATHY: Primary | ICD-10-CM

## 2023-08-17 PROCEDURE — 99213 PR OFFICE/OUTPT VISIT, EST, LEVL III, 20-29 MIN: ICD-10-PCS | Mod: 95,,, | Performed by: ORTHOPAEDIC SURGERY

## 2023-08-17 PROCEDURE — 99213 OFFICE O/P EST LOW 20 MIN: CPT | Mod: 95,,, | Performed by: ORTHOPAEDIC SURGERY

## 2023-08-17 NOTE — PROGRESS NOTES
Established Patient - Audio Only Telehealth Visit     The patient location is: home home  The chief complaint leading to consultation is: EMG results  Visit type: Virtual visit with audio only (telephone)  Total time spent with patient: 10 min       The reason for the audio only service rather than synchronous audio and video virtual visit was related to technical difficulties or patient preference/necessity.     Each patient to whom I provide medical services by telemedicine is:  (1) informed of the relationship between the physician and patient and the respective role of any other health care provider with respect to management of the patient; and (2) notified that they may decline to receive medical services by telemedicine and may withdraw from such care at any time. Patient verbally consented to receive this service via voice-only telephone call.    DATE: 8/17/2023  PATIENT: John Salcido    Attending Physician: Josh Peterson MD    HISTORY:  John Salcido is a 57 y.o. male who returns to me today for EMG results.  He was last seen by me 7/27/2023.  Today he is doing well but notes neck, bilateral arm, low back, and bilateral leg pain..    The Patient denies myelopathic symptoms such as handwriting changes or difficulty with buttons/coins/keys. Denies perineal paresthesias, bowel/bladder dysfunction.    PMH/PSH/FamHx/SocHx:  Unchanged from prior visit    ROS:  REVIEW OF SYSTEMS:  Constitution: Negative. Negative for chills, fever and night sweats.   HENT: Negative for congestion and headaches.    Eyes: Negative for blurred vision, left vision loss and right vision loss.   Cardiovascular: Negative for chest pain and syncope.   Respiratory: Negative for cough and shortness of breath.    Endocrine: Negative for polydipsia, polyphagia and polyuria.   Hematologic/Lymphatic: Negative for bleeding problem. Does not bruise/bleed easily.   Skin: Negative for dry skin, itching and rash.   Musculoskeletal: Negative for falls  "and muscle weakness.   Gastrointestinal: Negative for abdominal pain and bowel incontinence.   Allergic/Immunologic: Negative for hives and persistent infections.  Genitourinary: Negative for urinary retention/incontinence and nocturia.   Neurological: negative for disturbances in coordination, no myelopathic symptoms such as handwriting changes or difficulty with buttons, coins, keys or small objects. No loss of balance and seizures.   Psychiatric/Behavioral: Negative for depression. The patient does not have insomnia.   Denies myelopathic symptoms, perineal paresthesias, bowel or bladder incontinence    EXAM:  There were no vitals taken for this visit.    My physical examination was notable for the following findings:     Musculoskeletal and neuro exam stable    IMAGING:    Today I personally re-reviewed AP, Lat and Flex/Ex  upright C-spine films that demonstrate degenerative changes at C5-6.     MRI cervical demonstrates mild disc osteophyte complex at C5-6.     MRI lumbar demonstrates unchanged 1.2 x 0.8 x 0.6 cm intradural lesion at the L2 level (8:10 and 7:22).  No convincing enhancement. Stability favors a benign process such as a myxopapillary ependymoma, schwannoma, or meningioma. Multilevel degenerative changes, most advanced at L4-5 where there is degenerative grade 1 anterolisthesis resulting in mild canal stenosis and moderate bilateral foraminal stenosis.    EMG BUE demonstrates severe bilateral carpal tunnel syndrome.    There is no height or weight on file to calculate BMI.    No results found for: "HGBA1C"      ASSESSMENT/PLAN:    There are no diagnoses linked to this encounter.    Today we discussed at length all of the different treatment options including anti-inflammatories, acetaminophen, rest, ice, heat, physical therapy including strengthening and stretching exercises, home exercises, ROM, aerobic conditioning, aqua therapy, other modalities including ultrasound, massage, and dry needling, " epidural steroid injections and finally surgical intervention.      Pt presents with chronic cervical and lumbar radiculopathy. Given no high grade stenosis on MRI cervical and no chronic cervical radiculopathy on EMG, no surgical intervention indicated at this time. Will refer to pain management for neck and low back. If pt fails conservative rx for low back, could consider L4-5 TLIF. Offered hand surgery referral for carpal tunnel as well, pt will consider.                             This service was not originating from a related E/M service provided within the previous 7 days nor will  to an E/M service or procedure within the next 24 hours or my soonest available appointment.  Prevailing standard of care was able to be met in this audio-only visit.

## 2023-08-29 ENCOUNTER — OFFICE VISIT (OUTPATIENT)
Dept: PAIN MEDICINE | Facility: CLINIC | Age: 58
End: 2023-08-29
Payer: COMMERCIAL

## 2023-08-29 VITALS
DIASTOLIC BLOOD PRESSURE: 86 MMHG | HEART RATE: 66 BPM | BODY MASS INDEX: 33.15 KG/M2 | HEIGHT: 68 IN | RESPIRATION RATE: 16 BRPM | OXYGEN SATURATION: 95 % | WEIGHT: 218.69 LBS | SYSTOLIC BLOOD PRESSURE: 128 MMHG

## 2023-08-29 DIAGNOSIS — M54.12 CERVICAL RADICULOPATHY: ICD-10-CM

## 2023-08-29 DIAGNOSIS — M51.36 DDD (DEGENERATIVE DISC DISEASE), LUMBAR: Primary | ICD-10-CM

## 2023-08-29 PROCEDURE — 1159F MED LIST DOCD IN RCRD: CPT | Mod: CPTII,S$GLB,, | Performed by: ANESTHESIOLOGY

## 2023-08-29 PROCEDURE — 3079F PR MOST RECENT DIASTOLIC BLOOD PRESSURE 80-89 MM HG: ICD-10-PCS | Mod: CPTII,S$GLB,, | Performed by: ANESTHESIOLOGY

## 2023-08-29 PROCEDURE — 1159F PR MEDICATION LIST DOCUMENTED IN MEDICAL RECORD: ICD-10-PCS | Mod: CPTII,S$GLB,, | Performed by: ANESTHESIOLOGY

## 2023-08-29 PROCEDURE — 99999 PR PBB SHADOW E&M-EST. PATIENT-LVL V: CPT | Mod: PBBFAC,,, | Performed by: ANESTHESIOLOGY

## 2023-08-29 PROCEDURE — 3079F DIAST BP 80-89 MM HG: CPT | Mod: CPTII,S$GLB,, | Performed by: ANESTHESIOLOGY

## 2023-08-29 PROCEDURE — 1160F PR REVIEW ALL MEDS BY PRESCRIBER/CLIN PHARMACIST DOCUMENTED: ICD-10-PCS | Mod: CPTII,S$GLB,, | Performed by: ANESTHESIOLOGY

## 2023-08-29 PROCEDURE — 3008F PR BODY MASS INDEX (BMI) DOCUMENTED: ICD-10-PCS | Mod: CPTII,S$GLB,, | Performed by: ANESTHESIOLOGY

## 2023-08-29 PROCEDURE — 1160F RVW MEDS BY RX/DR IN RCRD: CPT | Mod: CPTII,S$GLB,, | Performed by: ANESTHESIOLOGY

## 2023-08-29 PROCEDURE — 99999 PR PBB SHADOW E&M-EST. PATIENT-LVL V: ICD-10-PCS | Mod: PBBFAC,,, | Performed by: ANESTHESIOLOGY

## 2023-08-29 PROCEDURE — 3074F PR MOST RECENT SYSTOLIC BLOOD PRESSURE < 130 MM HG: ICD-10-PCS | Mod: CPTII,S$GLB,, | Performed by: ANESTHESIOLOGY

## 2023-08-29 PROCEDURE — 99204 OFFICE O/P NEW MOD 45 MIN: CPT | Mod: S$GLB,,, | Performed by: ANESTHESIOLOGY

## 2023-08-29 PROCEDURE — 99204 PR OFFICE/OUTPT VISIT, NEW, LEVL IV, 45-59 MIN: ICD-10-PCS | Mod: S$GLB,,, | Performed by: ANESTHESIOLOGY

## 2023-08-29 PROCEDURE — 3074F SYST BP LT 130 MM HG: CPT | Mod: CPTII,S$GLB,, | Performed by: ANESTHESIOLOGY

## 2023-08-29 PROCEDURE — 3008F BODY MASS INDEX DOCD: CPT | Mod: CPTII,S$GLB,, | Performed by: ANESTHESIOLOGY

## 2023-08-29 RX ORDER — DEXLANSOPRAZOLE 60 MG/1
1 CAPSULE, DELAYED RELEASE ORAL EVERY MORNING
COMMUNITY
Start: 2023-05-10

## 2023-08-29 RX ORDER — CELECOXIB 200 MG/1
200 CAPSULE ORAL DAILY
Qty: 90 CAPSULE | Refills: 0 | Status: SHIPPED | OUTPATIENT
Start: 2023-08-29 | End: 2023-11-28

## 2023-08-29 NOTE — PROGRESS NOTES
[facility] NEW Patient Evaluation  John Salcido  : 1965  Date: 2023     CHIEF COMPLAINT:  Back Pain    Referring Physician Lianna Fitzpatrick    Primary Care Physician Herber Hernandez MD    HPI:  This is a 57 y.o. male with a chief complaint of Back Pain  . The patient  has a past medical history of High cholesterol, Hypertension, and Kidney stones.    Patient was seen and evaluated by orthopedic team for chronic low back and neck pain in addition to upper and lower extremity radiculopathy.      Patient has MRI lumbar spine 2023 showed unchanged 1.2 cm intradural lesion at L2 level suggested benign process in addition to multilevel degenerative change most pronounced at L4-L5 with grade 1 anterolisthesis with mild central canal stenosis and moderate bilateral neural foramina narrowing.    There is no surgical indication for the above intradural lesion, patient had a recommendation for repeating MRI every 2 years as needed.    Patient also has cervical MRI that was unremarkable except at C5-C6 minimal central canal stenosis and mild left neuroforaminal narrowing    Currently is here for the low back as he wants to address this first.  He wants to have interventional procedure to alleviate his pain based on orthopedic referral to avoid having a surgery(potential plan is L4-L5 TLIF) if he failed conservative therapy.      Diabetic: No    Anticogualtion drugs: None    Current Description of Pain Symptoms:  Pain started:   Pain Location:  Low back pain described as burning feeling  Radiates to the bilateral lower extremity mainly in the posterior aspect of both thighs, then radiates to the left leg(he has a previous injury in the left leg).   Pain is Getting worse over the last 3 months    The pain is described as burning and numbing.   Exacerbating factors:  Any activity including sitting and standing.   Mitigating factors he takes honey in addition to anti-inflammatory  medications.   Symptoms interfere with daily activity and work.   The patient feels like symptoms have been worsening.   Patient denies night fever/night sweats, bowel incontinence, significant weight loss, significant motor weakness, and loss of sensations.  He has baseline urinary incontinence related to urology and problem    Pain score:   Current: Pain is 3    Current pain medications:  He takes Tylenol as needed for pain    Current Opioid Pain Medication:  Opioids- None    UDS  Last Urine Drug Screen Assay for pain medications and substances of abuse has been reviewed as part of comprehensive assessment of analgesic therapy and  consistent with their prescribed and reported pain medication.    PDMP    Reviewed and consistent with medication use as prescribed.     Pain Treatment History:    Physical Therapy/HEP/Physician Lead exercise program :  Over the past 12 months, Patient has done > 0 sessions  PT response: Partially Helpful   Dates of the PT sessions: 2012  Is Patient participating in home exercise program (HEP): No    Non-interventional Pain Therapy:  Chiropractor:+ 2021  Acupuncture:-  TENS unit:+  Heat/ICE:  Back Brace:    Medications previously tried:  NSAIDs: Celebrex (Celecoxib)  Topical Agent: NA  TCA/SSRI/SNRI: None  Anti-convulsants: Gabapentin   Muscle Relaxants: Flexeril (Cyclobenzaprine)  Opioids- None  Benzodiazepines: No      Interventional Pain Procedures:   Cervical epidural steroid injection 2021 no relief   CMBB 2021- no relief   Previous spine/joint surgery:   No    Surgical History:   has a past surgical history that includes Cystoscopy w/ retrogrades (Bilateral, 9/13/2018); Laser lithotripsy (Left, 9/13/2018); Ureteroscopic removal of ureteric calculus (Left, 9/13/2018); Ureteroscopy (Left, 9/13/2018); and Ureteral stent placement (Left, 9/13/2018).    Medical History     has a past medical history of High cholesterol, Hypertension, and Kidney stones.    Family History:  family  history is not on file.    Allergies:  Patient has no known allergies.     Medications  Current Outpatient Medications   Medication Sig    allopurinol (ZYLOPRIM) 300 MG tablet Take 300 mg by mouth once daily.    dexlansoprazole (DEXILANT) 60 mg capsule Take 1 capsule by mouth every morning.    metoprolol succinate (TOPROL-XL) 50 MG 24 hr tablet Take 50 mg by mouth 2 (two) times daily.    celecoxib (CELEBREX) 200 MG capsule Take 1 capsule (200 mg total) by mouth once daily.    simvastatin (ZOCOR) 20 MG tablet Take 20 mg by mouth once daily.     No current facility-administered medications for this visit.        Social History/SUBSTANCE ABUSE HISTORY:  Personal history of substance abuse: No     reports that he has never smoked. He has never used smokeless tobacco. He reports that he does not drink alcohol and does not use drugs.      LABS:  CBC  Lab Results   Component Value Date    WBC 6.90 09/12/2018    HGB 16.1 09/12/2018    HCT 46.5 09/12/2018    MCV 88 09/12/2018     09/12/2018       CMP  Sodium   Date Value Ref Range Status   09/12/2018 141 136 - 145 mmol/L Final     Potassium   Date Value Ref Range Status   09/12/2018 4.4 3.5 - 5.1 mmol/L Final     Chloride   Date Value Ref Range Status   09/12/2018 105 95 - 110 mmol/L Final     CO2   Date Value Ref Range Status   09/12/2018 27 23 - 29 mmol/L Final     Glucose   Date Value Ref Range Status   09/12/2018 122 (H) 74 - 106 mg/dL Final     BUN   Date Value Ref Range Status   09/12/2018 17 9 - 20 mg/dL Final     Creatinine   Date Value Ref Range Status   07/14/2023 0.9 0.5 - 1.4 mg/dL Final     Calcium   Date Value Ref Range Status   09/12/2018 10.0 8.4 - 10.2 mg/dL Final     Total Protein   Date Value Ref Range Status   09/12/2018 7.5 6.3 - 8.2 g/dL Final     Albumin   Date Value Ref Range Status   09/12/2018 4.8 3.5 - 5.2 g/dL Final     Total Bilirubin   Date Value Ref Range Status   09/12/2018 1.8 (H) 0.2 - 1.3 mg/dL Final     Alkaline Phosphatase   Date  "Value Ref Range Status   09/12/2018 68 38 - 145 U/L Final     AST   Date Value Ref Range Status   09/12/2018 30 17 - 59 U/L Final     ALT   Date Value Ref Range Status   09/12/2018 47 (H) 10 - 44 U/L Final     eGFR if    Date Value Ref Range Status   09/12/2018 >60 >60 mL/min/1.73 m^2 Final     eGFR if non    Date Value Ref Range Status   09/12/2018 >60 >60 mL/min/1.73 m^2 Final     Comment:     Calculation used to obtain the estimated glomerular filtration  rate (eGFR) is the CKD-EPI equation.          HGBA1C  No results found for: "LABA1C", "HGBA1C"    ROS:    Review of Systems   Musculoskeletal:  Positive for arthralgias, back pain, myalgias, neck pain and neck stiffness.        GENERAL:  No weight loss, malaise or fevers.  HEENT:   No recent changes in vision or hearing  NECK:  Negative for lumps, no difficulty with swallowing.  RESPIRATORY:  Negative for cough, wheezing or shortness of breath, patient denies any recent URI.  CARDIOVASCULAR:  Negative for chest pain, leg swelling or palpitations.  GI:  Negative for abdominal discomfort, blood in stools or black stools or change in bowel habits.  MUSCULOSKELETAL:  See HPI.  SKIN:  Negative for lesions, rash, and itching.  PSYCH:  No mood disorder or recent psychosocial stressors.   HEMATOLOGY/LYMPHOLOGY:  Negative for prolonged bleeding, bruising easily or swollen nodes.  Patient is not currently taking any anti-coagulants  NEURO:  See HPI  All other reviewed and negative other than HPI.    PHYSICAL EXAM:    VITALS: /86   Pulse 66   Resp 16   Ht 5' 8" (1.727 m)   Wt 99.2 kg (218 lb 11.2 oz)   SpO2 95%   BMI 33.25 kg/m²   Body mass index is 33.25 kg/m².    GENERAL: Well appearing, in no acute distress, alert and oriented x3, answers questions appropriately.   PSYCH: Flat affect.    SKIN: Skin color, texture, turgor normal, no rashes or lesions.  HEAD/FACE:  Normocephalic, atraumatic. Cranial nerves grossly intact.  CV: " Regular rate  PULM: No evidence of respiratory difficulty, symmetric chest rise.  GI:  Soft and non-Distended.      BACK/SIJ/HIP  Lumbar Spine Exam:       Inspection: No erythema, bruising.       Palpation: (+) TTP of lumbar paraspinals bilaterally      ROM:  Limited in flexion, extension, lateral bending.       (+) Facet loading bilaterally      (+) Straight Leg Raise bilaterally  Hip Exam:      Inspection: No gross deformity or apparent leg length discrepancy      Palpation:  No TTP to bilateral greater trochanteric bursas.       ROM:  No limitation or pain in internal rotation, external rotation b/l  Neurologic Exam:     Alert. Speech is fluent and appropriate.     Strength: 5/5 in right hip flexion and knee extension, otherwise 5/5 throughout bilateral lower extremities     Sensation:  Grossly intact to light touch in bilateral lower extremities     Tone: No abnormality appreciated in bilateral lower extremities     No Clonus    GAIT: Normal     DIAGNOSTIC STUDIES AND MEDICAL RECORDS REVIEW:        I have personally reviewed and interpreted relevant radiology reports and reviewed relevant records from other services in the EMR.      MRI LUMBAR SPINE W WO CONTRAST 08/2023     CLINICAL HISTORY:  Paraspinal mass/tumor;  Localized swelling, mass and lump, unspecified     TECHNIQUE:  MRI of the lumbar spine, before and after intravenous administration of 9 mL Gadavist.     COMPARISON:  MRI lumbar spine 06/27/2014     FINDINGS:  ALIGNMENT: Transitional lumbosacral anatomy with sacralization of L5 on the left.  The same numbering scheme was used from the 06/27/2014 study.  Grade 1 anterolisthesis at L4-5.     BONE: No compression fractures.  No marrow replacing lesions.     JOINT: Disc desiccation at multiple levels.  Intervertebral disc heights are preserved.  Multilevel facet arthropathy, lower lumbar predominant.  There is subchondral bone marrow edema at the left L4-5 facet joint.     SPINAL CANAL: The conus  medullaris has a normal appearance and terminates at the L1 level.  Unchanged 1.2 x 0.8 x 0.6 cm intradural lesion at the L2 level (8:10 and 7:22).  No convincing enhancement.  It is surrounded by cauda equina nerve roots, but difficult to tell if it involves the filum terminale or a cauda equina nerve root.  No new mass or collection.     PARASPINAL SOFT TISSUES: Unremarkable.     SIGNIFICANT FINDINGS BY LEVEL:     T12-L1: Unremarkable.     L1-2: Unremarkable.     L2-3: Mild disc bulge.  No canal or foraminal stenosis.     L3-4: Mild disc bulge.  No canal or foraminal stenosis.     L4-5: Disc bulge and posterior disc uncovering.  Severe facet arthropathy and ligamentum flavum thickening bilaterally.  There are bilateral facet joint effusions/synovitis.  Mild canal stenosis.  Moderate bilateral foraminal stenosis.     L5-S1: Unremarkable.     Impression:     Unchanged 1.2 cm intradural lesion at the L2 level.  Stability favors a benign process such as a myxopapillary ependymoma, schwannoma, or meningioma.     Multilevel degenerative changes, most advanced at L4-5 where there is degenerative grade 1 anterolisthesis resulting in mild canal stenosis and moderate bilateral foraminal stenosis.     Transitional lumbosacral anatomy.    MRI CERVICAL SPINE WITHOUT CONTRAST     CLINICAL HISTORY:  Neck pain, chronic, degenerative changes on xray;.  Radiculopathy, cervical region     TECHNIQUE:  Multiplanar, multisequence MR images of the cervical spine were acquired without the administration of contrast.     COMPARISON:  Radiographs 07/07/2023     FINDINGS:  ALIGNMENT: Normal.     BONE: No compression fractures.  Fat containing lesions in the right C2 lateral mass and T3 vertebral body, likely hemangiomas.  No aggressive focal lesion.     JOINT: Disc desiccation at multiple levels.  Intervertebral disc heights are preserved.  Multilevel facet arthropathy.  No bone marrow edema.     SPINAL CANAL: The cervical spinal cord is  unremarkable.  No mass or collection.     POSTERIOR FOSSA: Unremarkable.  Partially imaged retention cyst in the left maxillary sinus.     PARASPINAL SOFT TISSUES: Unremarkable.     SIGNIFICANT FINDINGS BY LEVEL:     C2-3: No posterior disc abnormality.  No canal stenosis.  Mild right foraminal stenosis.     C3-4: Unremarkable.     C4-5: Unremarkable.     C5-6: Disc osteophyte complex.  Minimal canal stenosis.  Mild left foraminal stenosis.     C6-7: No posterior disc abnormality.  No canal stenosis.  Mild left foraminal stenosis.     C7-T1: Unremarkable.     Impression:     Mild C5-6 predominant degenerative changes.  No high-grade stenosis.        ASSESSMENT AND PLAN:  John Salcido is a 57 y.o. male with the following diagnoses based on history, exam, and imaging:    Problem List Items Addressed This Visit    None  Visit Diagnoses       DDD (degenerative disc disease), lumbar    -  Primary    Relevant Medications    celecoxib (CELEBREX) 200 MG capsule    Other Relevant Orders    Ambulatory referral/consult to Physical/Occupational Therapy    Cervical radiculopathy                 This is a pleasant 57 y.o. gentleman presenting with chronic low back pain and bilateral lower extremity radiculopathy described as numbness and tingling mainly in the posterior/lateral aspect of his bilateral thighs, he has not done physical therapy and it does not take pain medications, he was referred by orthopedic interventional procedure.      I did advise him 1st to try physical therapy for at least 3 weeks before considering injection and I gave him short script Celebrex to be uses as needed for pain      We discussed the underlying diagnoses and multiple treatment options including non-opioid medications, interventional procedures, physical therapy, home exercise, core muscle enhancement, and weight loss.  The risks and benefits of each treatment option were discussed and all questions were answered.      Treatment Plan    1-Diagnostics/Referrals:  PT referral     2-Medications:  NSAIDs:  Start Celebrex 200 mg daily as needed for pain  Topical Agent: NA  TCA/SSRI/SNRI: None  Anti-convulsants: None  Muscle Relaxants: None  Opioids- None    3-Interventional Therapy:  Discuss with the him L4-L5 interlaminar epidural steroid injectio if  he fails to improve with conservative therapy including Celebrex and formal physical therapy    Regarding the above interventions, the patient has been educated regarding the risks (including bleeding, infection, increased pain, nerve damage, or allergic reaction), benefits, and alternatives. The patient states he understands and is eager to proceed.    4-Physical Rehabilitation: Referral to Physical therapy for Lumbar stabilization, core strengthening, and a home exercise program.    5-Patient Education Counseled patient regarding the importance of activity modification and physical therapy, I have stressed the importance of physical activity and a home exercise plan to help with pain and improve health    6-Follow-up: RTC 6 week    May consider:  Consider Lyrica, muscle relaxer, transforaminal epidural steroid injection, addressing his neck pain, surgical referral    I would like to thank Lianna Fitzpatrick,* for the opportunity to assist in the care of this patient. We had a very nice visit and I look forward to continuing their care. Please let me know if I can be of further assistance.     Shine Moore MD  Anesthesia  Interventional Pain Medicine  08/29/2023    Disclaimer:  This note was prepared using voice recognition system and is likely to have sound alike errors that may have been overlooked even after proof reading.  Please call me with any questions.

## 2023-09-29 ENCOUNTER — CLINICAL SUPPORT (OUTPATIENT)
Dept: REHABILITATION | Facility: HOSPITAL | Age: 58
End: 2023-09-29
Attending: ANESTHESIOLOGY
Payer: COMMERCIAL

## 2023-09-29 DIAGNOSIS — M43.6 NECK STIFFNESS: ICD-10-CM

## 2023-09-29 DIAGNOSIS — Z74.09 DECREASED FUNCTIONAL MOBILITY AND ENDURANCE: ICD-10-CM

## 2023-09-29 DIAGNOSIS — M51.36 DDD (DEGENERATIVE DISC DISEASE), LUMBAR: ICD-10-CM

## 2023-09-29 DIAGNOSIS — M62.08 DIASTASIS RECTI: ICD-10-CM

## 2023-09-29 DIAGNOSIS — M54.2 CERVICAL PAIN (NECK): ICD-10-CM

## 2023-09-29 DIAGNOSIS — M54.50 LUMBAR PAIN: ICD-10-CM

## 2023-09-29 PROCEDURE — 97163 PT EVAL HIGH COMPLEX 45 MIN: CPT | Mod: PN

## 2023-09-29 PROCEDURE — 97110 THERAPEUTIC EXERCISES: CPT | Mod: PN

## 2023-10-02 ENCOUNTER — CLINICAL SUPPORT (OUTPATIENT)
Dept: REHABILITATION | Facility: HOSPITAL | Age: 58
End: 2023-10-02
Payer: COMMERCIAL

## 2023-10-02 DIAGNOSIS — M54.50 LUMBAR PAIN: Primary | ICD-10-CM

## 2023-10-02 DIAGNOSIS — M43.6 NECK STIFFNESS: ICD-10-CM

## 2023-10-02 DIAGNOSIS — M62.08 DIASTASIS RECTI: ICD-10-CM

## 2023-10-02 DIAGNOSIS — M54.2 CERVICAL PAIN (NECK): ICD-10-CM

## 2023-10-02 PROBLEM — M51.369 DDD (DEGENERATIVE DISC DISEASE), LUMBAR: Status: ACTIVE | Noted: 2023-10-02

## 2023-10-02 PROBLEM — M51.36 DDD (DEGENERATIVE DISC DISEASE), LUMBAR: Status: ACTIVE | Noted: 2023-10-02

## 2023-10-02 PROBLEM — Z74.09 DECREASED FUNCTIONAL MOBILITY AND ENDURANCE: Status: ACTIVE | Noted: 2023-10-02

## 2023-10-02 PROCEDURE — 97110 THERAPEUTIC EXERCISES: CPT | Mod: PN,CQ

## 2023-10-02 PROCEDURE — 97140 MANUAL THERAPY 1/> REGIONS: CPT | Mod: PN,CQ
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
## 2023-10-02 NOTE — PROGRESS NOTES
[facility]   Physical Therapy Treatment Note      Name: John Salcido  Clinic Number: [id number]    Therapy Diagnosis:   Encounter Diagnoses   Name Primary?    Lumbar pain Yes    Cervical pain (neck)     Neck stiffness     Diastasis recti      Physician: Shine Moore MD    Visit Date: 10/2/2023    Physician Orders: PT Eval and Treat   Medical Diagnosis from Referral: M51.36 (ICD-10-CM) - DDD (degenerative disc disease), lumbar  Evaluation Date: 9/29/2023  Authorization Period Expiration: 08/24/2024  Plan of Care Expiration: 12/30/2023  Progress Note Due: 10/29/2023  Visit # / Visits authorized: 1/ 1   FOTO: 1/ 3     Precautions: Standard      Time In: 1634  Time Out: 1721  Total Billable Time: 47 minutes    PTA Visit #: 1/5       Subjective     Patient reports: Patient reports that he is having R side neck/upper trap pain today.  He was compliant with home exercise program.  Response to previous treatment: 1st routine visit  Functional change: 1st routine visit    Pain: 5/10  Location:  Right side of neck/upper trap.     Objective      Objective Measures updated at progress report unless specified.     Treatment     John received the treatments listed below:      therapeutic exercises to develop strength, endurance, ROM, flexibility, posture, and core stabilization for 27 minutes including:  Review of HEP  - Upper trap stretch 3x10 sec hold bilaterally  - Levator scapula stretch bilaterally 3x10 sec hold  - Tranverse abdominus contraction x10 with 3 sec hold  - Lateral trunk rotation x10  reps bilaterally   - SKTC x10 with 5 sec hold  - Quadratus Lumborum stretch bilaterally 2x20 sec hold bilaterally   - HS stretch bilaterally 2x20 sec hold seated  - Forward trunk flexion seated 1x20 sec hold    - Bridge with ball squeeze 2x10 reps   -     manual therapy techniques: Soft tissue Mobilization were applied to the: R upper trap and posterior neck for 10 minutes, including: to relieve pain  symptoms Improve ROM ROM  in neck/upper trap      neuromuscular re-education activities to improve: Balance, Coordination, and Posture for 00 minutes. The following activities were included:  Not addressed     therapeutic activities to improve functional performance for 00  minutes, including:  Not addressed     gait training to improve functional mobility and safety for 00  minutes, including:  Not addressed     direct contact modalities after being cleared for contraindications:Not addressed     supervised modalities after being cleared for contradictions: Not addressed     hot pack for 00 minutes to Not addressed .    cold pack for 00 minutes to Not addressed .    Patient Education and Home Exercises       Education provided:   - Review of HEP issued at evaluation.     Written Home Exercises Provided: Patient instructed to cont prior HEP. Exercises were reviewed and John was able to demonstrate them prior to the end of the session.  John demonstrated good  understanding of the education provided. See Electronic Medical Record under Patient Instructions for exercises provided during therapy sessions    Assessment     Patient required verbal cues for correct technique with HEP. Tactile cuing with instruction for TA contraction. Patient able to demonstrate after Tactile and verbal cuing. Patient indicates decreased pain in R upper trap, R side posterior neck with soft tissue mobilization and review of upper trap and levator scapulae stretches to 2/10.     John Is progressing well towards his goals.   Patient prognosis is Good.     Patient will continue to benefit from skilled outpatient physical therapy to address the deficits listed in the problem list box on initial evaluation, provide pt/family education and to maximize pt's level of independence in the home and community environment.     Patient's spiritual, cultural and educational needs considered and pt agreeable to plan of care and goals.      Anticipated barriers to physical therapy: none    Goals:   Short Term Goals 6 weeks:    1. Patient demonstrates independence with HEP.   2. Patient demonstrates independence with Postural Awareness.   3. Patient demonstrates independence with body mechanics.   4. Pt will improve (B) LE strength by at least 1/2 grade on MMT where deficits noted in order to improve functional mobility.  5. Pt will improve FOTO lumbar survey score to </= 25% limited in order to demo improved functional mobility.  6. Patient demonstrates increased lumbar range of motion by at least 10% pain free or greater to improve tolerance to functional activities.      Long Term Goals 12 weeks:    1. Pt will be independent with updated HEP.  2. Pt will improve (B) LE strength by at least 1/2 full grade on MMT where deficits noted in order to improve functional mobility  3. Pt will improve FOTO lumbar survey score to </= 18% limited in order to demo improved functional mobility  4. Pt will report </= 1/10 pain in lumbar region when performing ADLs in order to be able to perform ADLs with less difficulty    5. Patient demonstrates increased (B) UE strength by at least 1/2 full grade on MMT where deficits noted to improve tolerance to functional activities.   6. Patient demonstrates increased cervical range of motion by at least 25% pain free or greater to improve tolerance to functional activities.   7.  Patient demonstrates increased lumbar range of motion by at least 25% pain free or greater to improve tolerance to functional activities.   8. Patient demonstrates decreased doming of abdominal wall with functional movements for improved pressure management.  9. Patient demonstrates correct lifting technique 90% of the time for decreased pain and improved pressure management.    Plan     Plan of care Certification: 9/29/2023 to 12/30/2023.     Outpatient Physical Therapy 2 times weekly for 12 weeks to include the following interventions: Electrical  Stimulation  , Manual Therapy, Moist Heat/ Ice, Neuromuscular Re-ed, Patient Education, Therapeutic Activities, and Therapeutic Exercise. Patient may be treated by a physical therapy assistant as part of their therapy plan    Renae Goodell, PTA

## 2023-10-09 ENCOUNTER — CLINICAL SUPPORT (OUTPATIENT)
Dept: REHABILITATION | Facility: HOSPITAL | Age: 58
End: 2023-10-09
Attending: ANESTHESIOLOGY
Payer: COMMERCIAL

## 2023-10-09 DIAGNOSIS — Z74.09 DECREASED FUNCTIONAL MOBILITY AND ENDURANCE: ICD-10-CM

## 2023-10-09 DIAGNOSIS — M43.6 NECK STIFFNESS: ICD-10-CM

## 2023-10-09 DIAGNOSIS — M54.2 CERVICAL PAIN (NECK): ICD-10-CM

## 2023-10-09 DIAGNOSIS — M54.50 LUMBAR PAIN: Primary | ICD-10-CM

## 2023-10-09 DIAGNOSIS — M62.08 DIASTASIS RECTI: ICD-10-CM

## 2023-10-09 PROCEDURE — 97110 THERAPEUTIC EXERCISES: CPT | Mod: PN

## 2023-10-09 NOTE — PROGRESS NOTES
[facility] EST Patient Evaluation  John Salcido  : 1965  Date: 10/10/2023     CHIEF COMPLAINT:  Neck Pain and Shoulder Pain (right)    Referring Physician Lianna Fitzpatrick    Primary Care Physician Herber Hernandez MD    HPI:  This is a 57 y.o. male with a chief complaint of Neck Pain and Shoulder Pain (right)  . The patient  has a past medical history of High cholesterol, Hypertension, and Kidney stones.    Patient was seen and evaluated by orthopedic team for chronic low back and neck pain in addition to upper and lower extremity radiculitis    Patient has MRI lumbar spine 2023 showed unchanged 1.2 cm intradural lesion at L2 level suggested benign process in addition to multilevel degenerative change most pronounced at L4-L5 with grade 1 anterolisthesis with mild central canal stenosis and moderate bilateral neural foramina narrowing.   Unchanged 1.2 x 0.8 x 0.6 cm intradural lesion at the L2 level (8:10 and 7:22).  No convincing enhancement.  There is no surgical indication for the above intradural lesion, patient had a recommendation for repeating MRI every 2 years as needed.    Patient also has cervical MRI that was unremarkable except at C5-C6 minimal central canal stenosis and mild left neuroforaminal narrowing with minimal relief from cervical epidural and cervical medial branch    1st evaluation for low back pain.  He wants to have interventional procedure to alleviate his pain based on orthopedic referral to avoid having a surgery(potential plan is L4-L5 TLIF) if he failed conservative therapy.      Interval History 10/10/2023:  John Salcido is here for follow up visit.  He started physical therapy, reported significant improvement in pain and functionality in his back pain, pain score is 2/10.  He stopped taking Celebrex due to GI upset.    He is complaining now of right neck and shoulder pain appears to me as myofascial in nature with mild tenderness on the right  cervical paraspinal muscle and right shoulder blade    Diabetic: No    Anticogualtion drugs: None    Current Description of Pain Symptoms:  Pain started: 2007  Pain Location:  Right-sided neck pain axial in nature with a right shoulder pain   Pain is Getting worse over the last 3 weeks   The pain is described as aching  Exacerbating factors:  Lateral neck flexion.   Mitigating factors physical therapy   Symptoms interfere with daily activity and work.   The patient feels like symptoms have been worsening.   Patient denies night fever/night sweats, bowel incontinence, significant weight loss, significant motor weakness, and loss of sensations.  He has baseline urinary incontinence related to urology and problem    Pain score:   Current: Pain is 3/10    Current pain medications:  He takes Tylenol as needed for pain    Current Opioid Pain Medication:  Opioids- None    UDS  na    PDMP    Reviewed and consistent with medication use as prescribed.     Pain Treatment History:    Physical Therapy/HEP/Physician Lead exercise program :  Over the past 12 months, Patient has done  2 sessions  PT response: very Helpful   Dates of the PT sessions:  September & October 2023  Is Patient participating in home exercise program (HEP): yes for his neck    Non-interventional Pain Therapy:  Chiropractor:+ 2021  Acupuncture:-  TENS unit:+  Heat/ICE:  Back Brace:    Medications previously tried:  NSAIDs: Celebrex (Celecoxib) GI upset  Topical Agent: NA  TCA/SSRI/SNRI: None  Anti-convulsants: Gabapentin   Muscle Relaxants: Flexeril (Cyclobenzaprine)  Opioids- None  Benzodiazepines: No      Interventional Pain Procedures:   Cervical epidural steroid injection 2021 no relief   CMBB 2021- no relief     Previous spine/joint surgery:   No    Surgical History:   has a past surgical history that includes Cystoscopy w/ retrogrades (Bilateral, 9/13/2018); Laser lithotripsy (Left, 9/13/2018); Ureteroscopic removal of ureteric calculus (Left,  9/13/2018); Ureteroscopy (Left, 9/13/2018); and Ureteral stent placement (Left, 9/13/2018).    Medical History     has a past medical history of High cholesterol, Hypertension, and Kidney stones.    Family History:  family history is not on file.    Allergies:  Patient has no known allergies.     Medications  Current Outpatient Medications   Medication Sig    allopurinol (ZYLOPRIM) 300 MG tablet Take 300 mg by mouth once daily.    celecoxib (CELEBREX) 200 MG capsule Take 1 capsule (200 mg total) by mouth once daily. (Patient not taking: Reported on 10/10/2023)    dexlansoprazole (DEXILANT) 60 mg capsule Take 1 capsule by mouth every morning.    methocarbamoL (ROBAXIN) 500 MG Tab Take 1 tablet (500 mg total) by mouth 2 (two) times daily as needed (pain).    metoprolol succinate (TOPROL-XL) 50 MG 24 hr tablet Take 50 mg by mouth 2 (two) times daily.    simvastatin (ZOCOR) 20 MG tablet Take 20 mg by mouth once daily.     No current facility-administered medications for this visit.        Social History/SUBSTANCE ABUSE HISTORY:  Personal history of substance abuse: No     reports that he has never smoked. He has never used smokeless tobacco. He reports that he does not drink alcohol and does not use drugs.      LABS:  CBC  Lab Results   Component Value Date    WBC 6.90 09/12/2018    HGB 16.1 09/12/2018    HCT 46.5 09/12/2018    MCV 88 09/12/2018     09/12/2018       CMP  Sodium   Date Value Ref Range Status   09/12/2018 141 136 - 145 mmol/L Final     Potassium   Date Value Ref Range Status   09/12/2018 4.4 3.5 - 5.1 mmol/L Final     Chloride   Date Value Ref Range Status   09/12/2018 105 95 - 110 mmol/L Final     CO2   Date Value Ref Range Status   09/12/2018 27 23 - 29 mmol/L Final     Glucose   Date Value Ref Range Status   09/12/2018 122 (H) 74 - 106 mg/dL Final     BUN   Date Value Ref Range Status   09/12/2018 17 9 - 20 mg/dL Final     Creatinine   Date Value Ref Range Status   07/14/2023 0.9 0.5 - 1.4 mg/dL  "Final     Calcium   Date Value Ref Range Status   09/12/2018 10.0 8.4 - 10.2 mg/dL Final     Total Protein   Date Value Ref Range Status   09/12/2018 7.5 6.3 - 8.2 g/dL Final     Albumin   Date Value Ref Range Status   09/12/2018 4.8 3.5 - 5.2 g/dL Final     Total Bilirubin   Date Value Ref Range Status   09/12/2018 1.8 (H) 0.2 - 1.3 mg/dL Final     Alkaline Phosphatase   Date Value Ref Range Status   09/12/2018 68 38 - 145 U/L Final     AST   Date Value Ref Range Status   09/12/2018 30 17 - 59 U/L Final     ALT   Date Value Ref Range Status   09/12/2018 47 (H) 10 - 44 U/L Final     eGFR if    Date Value Ref Range Status   09/12/2018 >60 >60 mL/min/1.73 m^2 Final     eGFR if non    Date Value Ref Range Status   09/12/2018 >60 >60 mL/min/1.73 m^2 Final     Comment:     Calculation used to obtain the estimated glomerular filtration  rate (eGFR) is the CKD-EPI equation.          HGBA1C  No results found for: "LABA1C", "HGBA1C"    ROS:    Review of Systems   Musculoskeletal:  Positive for arthralgias, back pain, myalgias, neck pain and neck stiffness.      GENERAL:  No weight loss, malaise or fevers.  HEENT:   No recent changes in vision or hearing  NECK:  Negative for lumps, no difficulty with swallowing.  RESPIRATORY:  Negative for cough, wheezing or shortness of breath, patient denies any recent URI.  CARDIOVASCULAR:  Negative for chest pain, leg swelling or palpitations.  GI:  Negative for abdominal discomfort, blood in stools or black stools or change in bowel habits.  MUSCULOSKELETAL:  See HPI.  SKIN:  Negative for lesions, rash, and itching.  PSYCH:  No mood disorder or recent psychosocial stressors.   HEMATOLOGY/LYMPHOLOGY:  Negative for prolonged bleeding, bruising easily or swollen nodes.  Patient is not currently taking any anti-coagulants  NEURO:  See HPI  All other reviewed and negative other than HPI.    PHYSICAL EXAM:    VITALS: BP (!) 144/96   Pulse 62   Resp 18   Ht 5' " "8" (1.727 m)   Wt 99.9 kg (220 lb 3.2 oz)   SpO2 96%   BMI 33.48 kg/m²   Body mass index is 33.48 kg/m².    GENERAL: Well appearing, in no acute distress, alert and oriented x3, answers questions appropriately.   PSYCH: Flat affect.    SKIN: Skin color, texture, turgor normal, no rashes or lesions.  HEAD/FACE:  Normocephalic, atraumatic. Cranial nerves grossly intact.  CV: Regular rate  PULM: No evidence of respiratory difficulty, symmetric chest rise.  GI:  Soft and non-Distended.    NECK: (+) pain to palpation over the cervical paraspinous muscles worse on the right side. Spurling:  Negative. (-) pain with neck flexion, extension, or lateral flexion, Muscle strength in RT UE 5/5 and Left UE 5/5, Hand  5/5    GAIT: Normal     DIAGNOSTIC STUDIES AND MEDICAL RECORDS REVIEW:        I have personally reviewed and interpreted relevant radiology reports and reviewed relevant records from other services in the EMR.      MRI LUMBAR SPINE W WO CONTRAST 08/2023     CLINICAL HISTORY:  Paraspinal mass/tumor;  Localized swelling, mass and lump, unspecified     TECHNIQUE:  MRI of the lumbar spine, before and after intravenous administration of 9 mL Gadavist.     COMPARISON:  MRI lumbar spine 06/27/2014     FINDINGS:  ALIGNMENT: Transitional lumbosacral anatomy with sacralization of L5 on the left.  The same numbering scheme was used from the 06/27/2014 study.  Grade 1 anterolisthesis at L4-5.     BONE: No compression fractures.  No marrow replacing lesions.     JOINT: Disc desiccation at multiple levels.  Intervertebral disc heights are preserved.  Multilevel facet arthropathy, lower lumbar predominant.  There is subchondral bone marrow edema at the left L4-5 facet joint.     SPINAL CANAL: The conus medullaris has a normal appearance and terminates at the L1 level.  Unchanged 1.2 x 0.8 x 0.6 cm intradural lesion at the L2 level (8:10 and 7:22).  No convincing enhancement.  It is surrounded by cauda equina nerve roots, but " difficult to tell if it involves the filum terminale or a cauda equina nerve root.  No new mass or collection.     PARASPINAL SOFT TISSUES: Unremarkable.     SIGNIFICANT FINDINGS BY LEVEL:     T12-L1: Unremarkable.     L1-2: Unremarkable.     L2-3: Mild disc bulge.  No canal or foraminal stenosis.     L3-4: Mild disc bulge.  No canal or foraminal stenosis.     L4-5: Disc bulge and posterior disc uncovering.  Severe facet arthropathy and ligamentum flavum thickening bilaterally.  There are bilateral facet joint effusions/synovitis.  Mild canal stenosis.  Moderate bilateral foraminal stenosis.     L5-S1: Unremarkable.     Impression:     Unchanged 1.2 cm intradural lesion at the L2 level.  Stability favors a benign process such as a myxopapillary ependymoma, schwannoma, or meningioma.     Multilevel degenerative changes, most advanced at L4-5 where there is degenerative grade 1 anterolisthesis resulting in mild canal stenosis and moderate bilateral foraminal stenosis.     Transitional lumbosacral anatomy.    MRI CERVICAL SPINE WITHOUT CONTRAST     CLINICAL HISTORY:  Neck pain, chronic, degenerative changes on xray;.  Radiculopathy, cervical region     TECHNIQUE:  Multiplanar, multisequence MR images of the cervical spine were acquired without the administration of contrast.     COMPARISON:  Radiographs 07/07/2023     FINDINGS:  ALIGNMENT: Normal.     BONE: No compression fractures.  Fat containing lesions in the right C2 lateral mass and T3 vertebral body, likely hemangiomas.  No aggressive focal lesion.     JOINT: Disc desiccation at multiple levels.  Intervertebral disc heights are preserved.  Multilevel facet arthropathy.  No bone marrow edema.     SPINAL CANAL: The cervical spinal cord is unremarkable.  No mass or collection.     POSTERIOR FOSSA: Unremarkable.  Partially imaged retention cyst in the left maxillary sinus.     PARASPINAL SOFT TISSUES: Unremarkable.     SIGNIFICANT FINDINGS BY LEVEL:     C2-3: No  posterior disc abnormality.  No canal stenosis.  Mild right foraminal stenosis.     C3-4: Unremarkable.     C4-5: Unremarkable.     C5-6: Disc osteophyte complex.  Minimal canal stenosis.  Mild left foraminal stenosis.     C6-7: No posterior disc abnormality.  No canal stenosis.  Mild left foraminal stenosis.     C7-T1: Unremarkable.     Impression:     Mild C5-6 predominant degenerative changes.  No high-grade stenosis.        ASSESSMENT AND PLAN:  John Salcido is a 57 y.o. male with the following diagnoses based on history, exam, and imagin. Myofascial neck pain  methocarbamoL (ROBAXIN) 500 MG Tab    Ambulatory referral/consult to Physical/Occupational Therapy        This is a pleasant 57 y.o. gentleman presenting with right-sided neck pain associated with the right shoulder blade pain , no numbness or tingling down the right arm.    We discussed the underlying diagnoses and multiple treatment options including non-opioid medications, interventional procedures, physical therapy, home exercise, core muscle enhancement, and weight loss.  The risks and benefits of each treatment option were discussed and all questions were answered.      Treatment Plan   1-Diagnostics/Referrals:  PT referral for the neck     2-Medications:  Patient does not want to be in chronic medication, he needs p.r.n.  NSAIDs: none   Topical Agent: NA  TCA/SSRI/SNRI: None  Anti-convulsants: None  Muscle Relaxants:  Start Robaxin 500 mg b.i.d. p.r.n., prescription was provided  Opioids- None    3-Interventional Therapy:  No interventional procedures indicated right now as he did very well from physical therapy    4-Patient Education Counseled patient regarding the importance of activity modification and physical therapy, I have stressed the importance of physical activity and a home exercise plan to help with pain and improve health    6-Follow-up: RTC he requested as needed trigger point injection    Shine Moore  MD  Anesthesia  Interventional Pain Medicine  10/10/2023    Disclaimer:  This note was prepared using voice recognition system and is likely to have sound alike errors that may have been overlooked even after proof reading.  Please call me with any questions.

## 2023-10-09 NOTE — PROGRESS NOTES
[facility]   Physical Therapy Treatment Note      Name: John Salcido  Clinic Number: [id number]    Therapy Diagnosis:   Encounter Diagnoses   Name Primary?    Lumbar pain Yes    Cervical pain (neck)     Neck stiffness     Decreased functional mobility and endurance     Diastasis recti      Physician: Shine Moore MD    Visit Date: 10/9/2023    Physician Orders: PT Eval and Treat   Medical Diagnosis from Referral: M51.36 (ICD-10-CM) - DDD (degenerative disc disease), lumbar  Evaluation Date: 9/29/2023  Authorization Period Expiration: 08/24/2024  Plan of Care Expiration: 12/30/2023  Progress Note Due: 10/29/2023  Visit # / Visits authorized: 1/ 1   FOTO: 1/ 3     Precautions: Standard      Time In: 4:00  Time Out: 4:42  Total Billable Time: 42 minutes    PTA Visit #: 1/5       Subjective     Patient reports: Patient reports he felt great on Saturday. He stayed busy Sunday moving boxes and doing a lot of walking which felt fine. He then sat in his recliner and felt like his vertebra were rubbing together when he tried to get back up. This lasted an hour but the more he moved, the better he felt.  He was compliant with home exercise program.  Response to previous treatment: patient felt great  Functional change: slight improvement in symptoms    Pain: 3/10  Location:  Right neck and lumbar spine    Objective      Objective Measures updated at progress report unless specified.     Treatment     John received the treatments listed below:      therapeutic exercises to develop strength, endurance, ROM, flexibility, posture, and core stabilization for 42 minutes including:  Review of HEP  - Upper trap stretch 3x30 sec hold bilaterally  - Levator scapula stretch bilaterally 2x60 sec hold  - Tranverse abdominus contraction 2 x10 with 3 sec hold  - Lateral trunk rotation x30  reps bilaterally   - SKTC x15 with 5 sec hold each leg  - Quadratus Lumborum stretch bilaterally 2x20 sec hold bilaterally    - HS stretch bilaterally 3x60 sec hold seated  - Forward trunk flexion seated 1 x 20 sec hold  - Bridge with ball squeeze x10 reps   - Sit to stand 3 x 10  - Pallof press 2 x 10 each side 13#    manual therapy techniques: Soft tissue Mobilization were applied to the: R upper trap and posterior neck for 00 minutes, including: to relieve pain symptoms Improve ROM ROM  in neck/upper trap      neuromuscular re-education activities to improve: Balance, Coordination, and Posture for 00 minutes. The following activities were included:  Not addressed     therapeutic activities to improve functional performance for 00  minutes, including:  Not addressed     gait training to improve functional mobility and safety for 00  minutes, including:  Not addressed     direct contact modalities after being cleared for contraindications:Not addressed     supervised modalities after being cleared for contradictions: Not addressed     hot pack for 00 minutes to Not addressed .    cold pack for 00 minutes to Not addressed .    Patient Education and Home Exercises       Education provided:   - Review of HEP issued at evaluation.     Written Home Exercises Provided: Patient instructed to cont prior HEP. Exercises were reviewed and John was able to demonstrate them prior to the end of the session.  John demonstrated good  understanding of the education provided. See Electronic Medical Record under Patient Instructions for exercises provided during therapy sessions    Assessment     Patient tolerated all interventions well and was challenged by his therapy program. Patient indicates he is feeling good after session. Patient will continue to be progressed as tolerated.     John Is progressing well towards his goals.   Patient prognosis is Good.     Patient will continue to benefit from skilled outpatient physical therapy to address the deficits listed in the problem list box on initial evaluation, provide pt/family education and to  maximize pt's level of independence in the home and community environment.     Patient's spiritual, cultural and educational needs considered and pt agreeable to plan of care and goals.     Anticipated barriers to physical therapy: none    Goals:   Short Term Goals 6 weeks:    1. Patient demonstrates independence with HEP.   2. Patient demonstrates independence with Postural Awareness.   3. Patient demonstrates independence with body mechanics.   4. Pt will improve (B) LE strength by at least 1/2 grade on MMT where deficits noted in order to improve functional mobility.  5. Pt will improve FOTO lumbar survey score to </= 25% limited in order to demo improved functional mobility.  6. Patient demonstrates increased lumbar range of motion by at least 10% pain free or greater to improve tolerance to functional activities.      Long Term Goals 12 weeks:    1. Pt will be independent with updated HEP.  2. Pt will improve (B) LE strength by at least 1/2 full grade on MMT where deficits noted in order to improve functional mobility  3. Pt will improve FOTO lumbar survey score to </= 18% limited in order to demo improved functional mobility  4. Pt will report </= 1/10 pain in lumbar region when performing ADLs in order to be able to perform ADLs with less difficulty    5. Patient demonstrates increased (B) UE strength by at least 1/2 full grade on MMT where deficits noted to improve tolerance to functional activities.   6. Patient demonstrates increased cervical range of motion by at least 25% pain free or greater to improve tolerance to functional activities.   7.  Patient demonstrates increased lumbar range of motion by at least 25% pain free or greater to improve tolerance to functional activities.   8. Patient demonstrates decreased doming of abdominal wall with functional movements for improved pressure management.  9. Patient demonstrates correct lifting technique 90% of the time for decreased pain and improved pressure  management.    Plan     Plan of care Certification: 9/29/2023 to 12/30/2023.     Outpatient Physical Therapy 2 times weekly for 12 weeks to include the following interventions: Electrical Stimulation  , Manual Therapy, Moist Heat/ Ice, Neuromuscular Re-ed, Patient Education, Therapeutic Activities, and Therapeutic Exercise. Patient may be treated by a physical therapy assistant as part of their therapy plan    Monique Lr, PT, DPT

## 2023-10-10 ENCOUNTER — OFFICE VISIT (OUTPATIENT)
Dept: PAIN MEDICINE | Facility: CLINIC | Age: 58
End: 2023-10-10
Payer: COMMERCIAL

## 2023-10-10 VITALS
DIASTOLIC BLOOD PRESSURE: 96 MMHG | HEIGHT: 68 IN | HEART RATE: 62 BPM | WEIGHT: 220.19 LBS | RESPIRATION RATE: 18 BRPM | BODY MASS INDEX: 33.37 KG/M2 | OXYGEN SATURATION: 96 % | SYSTOLIC BLOOD PRESSURE: 144 MMHG

## 2023-10-10 DIAGNOSIS — M54.2 MYOFASCIAL NECK PAIN: Primary | ICD-10-CM

## 2023-10-10 PROCEDURE — 3077F PR MOST RECENT SYSTOLIC BLOOD PRESSURE >= 140 MM HG: ICD-10-PCS | Mod: CPTII,S$GLB,, | Performed by: ANESTHESIOLOGY

## 2023-10-10 PROCEDURE — 99999 PR PBB SHADOW E&M-EST. PATIENT-LVL III: ICD-10-PCS | Mod: PBBFAC,,, | Performed by: ANESTHESIOLOGY

## 2023-10-10 PROCEDURE — 1159F MED LIST DOCD IN RCRD: CPT | Mod: CPTII,S$GLB,, | Performed by: ANESTHESIOLOGY

## 2023-10-10 PROCEDURE — 3077F SYST BP >= 140 MM HG: CPT | Mod: CPTII,S$GLB,, | Performed by: ANESTHESIOLOGY

## 2023-10-10 PROCEDURE — 1160F PR REVIEW ALL MEDS BY PRESCRIBER/CLIN PHARMACIST DOCUMENTED: ICD-10-PCS | Mod: CPTII,S$GLB,, | Performed by: ANESTHESIOLOGY

## 2023-10-10 PROCEDURE — 3008F BODY MASS INDEX DOCD: CPT | Mod: CPTII,S$GLB,, | Performed by: ANESTHESIOLOGY

## 2023-10-10 PROCEDURE — 3080F PR MOST RECENT DIASTOLIC BLOOD PRESSURE >= 90 MM HG: ICD-10-PCS | Mod: CPTII,S$GLB,, | Performed by: ANESTHESIOLOGY

## 2023-10-10 PROCEDURE — 3080F DIAST BP >= 90 MM HG: CPT | Mod: CPTII,S$GLB,, | Performed by: ANESTHESIOLOGY

## 2023-10-10 PROCEDURE — 1159F PR MEDICATION LIST DOCUMENTED IN MEDICAL RECORD: ICD-10-PCS | Mod: CPTII,S$GLB,, | Performed by: ANESTHESIOLOGY

## 2023-10-10 PROCEDURE — 99214 PR OFFICE/OUTPT VISIT, EST, LEVL IV, 30-39 MIN: ICD-10-PCS | Mod: S$GLB,,, | Performed by: ANESTHESIOLOGY

## 2023-10-10 PROCEDURE — 99214 OFFICE O/P EST MOD 30 MIN: CPT | Mod: S$GLB,,, | Performed by: ANESTHESIOLOGY

## 2023-10-10 PROCEDURE — 3008F PR BODY MASS INDEX (BMI) DOCUMENTED: ICD-10-PCS | Mod: CPTII,S$GLB,, | Performed by: ANESTHESIOLOGY

## 2023-10-10 PROCEDURE — 99999 PR PBB SHADOW E&M-EST. PATIENT-LVL III: CPT | Mod: PBBFAC,,, | Performed by: ANESTHESIOLOGY

## 2023-10-10 PROCEDURE — 1160F RVW MEDS BY RX/DR IN RCRD: CPT | Mod: CPTII,S$GLB,, | Performed by: ANESTHESIOLOGY

## 2023-10-10 RX ORDER — METHOCARBAMOL 500 MG/1
500 TABLET, FILM COATED ORAL 2 TIMES DAILY PRN
Qty: 60 TABLET | Refills: 0 | Status: SHIPPED | OUTPATIENT
Start: 2023-10-10 | End: 2023-11-09

## 2023-10-17 ENCOUNTER — CLINICAL SUPPORT (OUTPATIENT)
Dept: REHABILITATION | Facility: HOSPITAL | Age: 58
End: 2023-10-17
Payer: COMMERCIAL

## 2023-10-17 DIAGNOSIS — M62.08 DIASTASIS RECTI: ICD-10-CM

## 2023-10-17 DIAGNOSIS — Z74.09 DECREASED FUNCTIONAL MOBILITY AND ENDURANCE: ICD-10-CM

## 2023-10-17 DIAGNOSIS — M43.6 NECK STIFFNESS: ICD-10-CM

## 2023-10-17 DIAGNOSIS — M54.2 CERVICAL PAIN (NECK): ICD-10-CM

## 2023-10-17 DIAGNOSIS — M54.50 LUMBAR PAIN: Primary | ICD-10-CM

## 2023-10-17 PROCEDURE — 97110 THERAPEUTIC EXERCISES: CPT | Mod: PN,CQ

## 2023-10-17 NOTE — PROGRESS NOTES
[facility]   Physical Therapy Treatment Note      Name: John Salcido  Clinic Number: [id number]    Therapy Diagnosis:   Encounter Diagnoses   Name Primary?    Lumbar pain Yes    Cervical pain (neck)     Neck stiffness     Decreased functional mobility and endurance     Diastasis recti        Physician: Shine Moore MD    Visit Date: 10/17/2023    Physician Orders: PT Eval and Treat   Medical Diagnosis from Referral: M51.36 (ICD-10-CM) - DDD (degenerative disc disease), lumbar  Evaluation Date: 9/29/2023  Authorization Period Expiration: 08/24/2024  Plan of Care Expiration: 12/30/2023  Progress Note Due: 10/29/2023  Visit # / Visits authorized: 1/ 1   FOTO: 1/ 3     Precautions: Standard      Time In: 1430  Time Out: 1510  Total Billable Time: 40 minutes    PTA Visit #: 1/5       Subjective     Patient reports: that he is having R side upper quadrant pain and states that he is having acid reflux sx's. Patient states that he is seeing the doctor on Tueday 10/24/23.      He was compliant with home exercise program.  Response to previous treatment: patient felt great  Functional change: slight improvement in symptoms    Pain: 3/10  Location:  R side upper quadrant pain    Objective      Objective Measures updated at progress report unless specified.     Treatment     John received the treatments listed below:      therapeutic exercises to develop strength, endurance, ROM, flexibility, posture, and core stabilization for 42 minutes including:    - Bridge with green physioball 2x10 reps with 3 sec hold  - Sit to stand 2x5 with blue band above knees  - standing hip abduction and extension 2x12 reps  - Pallof press 2 x 10 each side 17# freemotion machine  - Lat pulldown 13# 2x12 on freemotion machine  - isabel pose 2x20 sec hold  - standing forward flexion pose at mat table 1x20 sec hold    Not addressed Today:   - Upper trap stretch 3x30 sec hold bilaterally  - Levator scapula stretch  bilaterally 2x60 sec hold  - Tranverse abdominus contraction 2 x10 with 3 sec hold  - Lateral trunk rotation x30  reps bilaterally   - SKTC x15 with 5 sec hold each leg  - Quadratus Lumborum stretch bilaterally 2x20 sec hold bilaterally   - HS stretch bilaterally 3x60 sec hold seated  - Forward trunk flexion seated 1 x 20 sec hold    manual therapy techniques: Soft tissue Mobilization were applied to the: R upper trap and posterior neck for 00 minutes, including: to relieve pain symptoms Improve ROM ROM  in neck/upper trap    neuromuscular re-education activities to improve: Balance, Coordination, and Posture for 00 minutes. The following activities were included:  Not addressed     therapeutic activities to improve functional performance for 00  minutes, including:  Not addressed     gait training to improve functional mobility and safety for 00  minutes, including:  Not addressed     direct contact modalities after being cleared for contraindications:Not addressed     supervised modalities after being cleared for contradictions: Not addressed     hot pack for 00 minutes to Not addressed .    cold pack for 00 minutes to Not addressed .    Patient Education and Home Exercises       Education provided:   - Review of HEP issued at evaluation.     Written Home Exercises Provided: Patient instructed to cont prior HEP. Exercises were reviewed and John was able to demonstrate them prior to the end of the session.  John demonstrated good  understanding of the education provided. See Electronic Medical Record under Patient Instructions for exercises provided during therapy sessions    Assessment     Patient tolerated all interventions well and was challenged by his therapy program. Patient indicates he is feeling good after session.  Patient indicates no pain in low back after PT session. Patient will continue to be progressed as tolerated.     John Is progressing well towards his goals.   Patient prognosis is Good.      Patient will continue to benefit from skilled outpatient physical therapy to address the deficits listed in the problem list box on initial evaluation, provide pt/family education and to maximize pt's level of independence in the home and community environment.     Patient's spiritual, cultural and educational needs considered and pt agreeable to plan of care and goals.     Anticipated barriers to physical therapy: none    Goals:   Short Term Goals 6 weeks:    1. Patient demonstrates independence with HEP.   2. Patient demonstrates independence with Postural Awareness.   3. Patient demonstrates independence with body mechanics.   4. Pt will improve (B) LE strength by at least 1/2 grade on MMT where deficits noted in order to improve functional mobility.  5. Pt will improve FOTO lumbar survey score to </= 25% limited in order to demo improved functional mobility.  6. Patient demonstrates increased lumbar range of motion by at least 10% pain free or greater to improve tolerance to functional activities.      Long Term Goals 12 weeks:    1. Pt will be independent with updated HEP.  2. Pt will improve (B) LE strength by at least 1/2 full grade on MMT where deficits noted in order to improve functional mobility  3. Pt will improve FOTO lumbar survey score to </= 18% limited in order to demo improved functional mobility  4. Pt will report </= 1/10 pain in lumbar region when performing ADLs in order to be able to perform ADLs with less difficulty    5. Patient demonstrates increased (B) UE strength by at least 1/2 full grade on MMT where deficits noted to improve tolerance to functional activities.   6. Patient demonstrates increased cervical range of motion by at least 25% pain free or greater to improve tolerance to functional activities.   7.  Patient demonstrates increased lumbar range of motion by at least 25% pain free or greater to improve tolerance to functional activities.   8. Patient demonstrates decreased  doming of abdominal wall with functional movements for improved pressure management.  9. Patient demonstrates correct lifting technique 90% of the time for decreased pain and improved pressure management.    Plan     Plan of care Certification: 9/29/2023 to 12/30/2023.     Outpatient Physical Therapy 2 times weekly for 12 weeks to include the following interventions: Electrical Stimulation  , Manual Therapy, Moist Heat/ Ice, Neuromuscular Re-ed, Patient Education, Therapeutic Activities, and Therapeutic Exercise. Patient may be treated by a physical therapy assistant as part of their therapy plan    Renae Goodell, PTA,

## 2023-10-23 ENCOUNTER — CLINICAL SUPPORT (OUTPATIENT)
Dept: REHABILITATION | Facility: HOSPITAL | Age: 58
End: 2023-10-23
Payer: COMMERCIAL

## 2023-10-23 DIAGNOSIS — M43.6 NECK STIFFNESS: ICD-10-CM

## 2023-10-23 DIAGNOSIS — Z74.09 DECREASED FUNCTIONAL MOBILITY AND ENDURANCE: ICD-10-CM

## 2023-10-23 DIAGNOSIS — M62.08 DIASTASIS RECTI: ICD-10-CM

## 2023-10-23 DIAGNOSIS — M54.50 LUMBAR PAIN: Primary | ICD-10-CM

## 2023-10-23 DIAGNOSIS — M54.2 CERVICAL PAIN (NECK): ICD-10-CM

## 2023-10-23 PROCEDURE — 97110 THERAPEUTIC EXERCISES: CPT | Mod: PN,CQ

## 2023-10-23 NOTE — PROGRESS NOTES
[facility]   Physical Therapy Treatment Note      Name: John Salcido  Clinic Number: [id number]    Therapy Diagnosis:   Encounter Diagnoses   Name Primary?    Lumbar pain Yes    Cervical pain (neck)     Neck stiffness     Decreased functional mobility and endurance     Diastasis recti        Physician: Shine Moore MD    Visit Date: 10/23/2023    Physician Orders: PT Eval and Treat   Medical Diagnosis from Referral: M51.36 (ICD-10-CM) - DDD (degenerative disc disease), lumbar  Evaluation Date: 9/29/2023  Authorization Period Expiration: 08/24/2024  Plan of Care Expiration: 12/30/2023  Progress Note Due: 10/29/2023  Visit # / Visits authorized: 1/ 1   FOTO: 1/ 3     Precautions: Standard      Time In: 1515  Time Out: 1558  Total Billable Time: 43 minutes    PTA Visit #: 2/5       Subjective     Patient reports: that he is having R side upper quadrant pain and states that he is having acid reflux sx's. Patient states that he is seeing the doctor on Tueday 10/24/23.      He was compliant with home exercise program.  Response to previous treatment: patient felt great  Functional change: slight improvement in symptoms    Pain: 2/10  Location:  LBP and neck/R shoulder pain  Objective      Objective Measures updated at progress report unless specified.     Treatment     John received the treatments listed below:      therapeutic exercises to develop strength, endurance, ROM, flexibility, posture, and core stabilization for minutes including:    - Bridge with green physioball 2x10 reps with 3 sec hold  - Lateral trunk rotation x30  reps bilaterally  - SKTC x15 with 5 sec hold each leg  - Quadratus Lumborum stretch bilaterally 2x10 sec hold bilaterally (Child's pose)forward, R and L  - Tranverse abdominus contraction 2 x10 with 5 sec hold  - Heel slide with SLR with eccentric control with TA bracing x10 reps bilaterally   -  Upper trap stretch 2x10 sec hold bilaterally  - L and R cervical  rotation stretch with towel 3x10 sec hold    Not addressed Today:   - standing forward flexion pose at mat table 1x20 sec hold  - Levator scapula stretch bilaterally 2x60 sec hold  - Sit to stand 2x5 with blue band above knees  - standing hip abduction and extension 2x12 reps  - Pallof press 2 x 10 each side 17# freemotion machine  - Lat pulldown 13# 2x12 on freemotion machine  - HS stretch bilaterally 3x60 sec hold seated  - Forward trunk flexion seated 1 x 20 sec hold    manual therapy techniques: Soft tissue Mobilization were applied to the: R upper trap and posterior neck for 00 minutes, including: to relieve pain symptoms Improve ROM ROM  in neck/upper trap    neuromuscular re-education activities to improve: Balance, Coordination, and Posture for 00 minutes. The following activities were included:  Not addressed     therapeutic activities to improve functional performance for 00  minutes, including:  Not addressed     gait training to improve functional mobility and safety for 00  minutes, including:  Not addressed     direct contact modalities after being cleared for contraindications:Not addressed     supervised modalities after being cleared for contradictions: Not addressed     hot pack for 00 minutes to Not addressed .    cold pack for 00 minutes to Not addressed .    Patient Education and Home Exercises       Education provided:   - Review of HEP issued at evaluation.     Written Home Exercises Provided: Patient instructed to cont prior HEP. Exercises were reviewed and John was able to demonstrate them prior to the end of the session.  John demonstrated good  understanding of the education provided. See Electronic Medical Record under Patient Instructions for exercises provided during therapy sessions    Assessment     Patient tolerated all interventions well and was challenged by his therapy program. Patient indicates no change in pain after PT session.   John Is progressing well towards his  goals.   Patient prognosis is Good.     Patient will continue to benefit from skilled outpatient physical therapy to address the deficits listed in the problem list box on initial evaluation, provide pt/family education and to maximize pt's level of independence in the home and community environment.     Patient's spiritual, cultural and educational needs considered and pt agreeable to plan of care and goals.     Anticipated barriers to physical therapy: none    Goals:   Short Term Goals 6 weeks:    1. Patient demonstrates independence with HEP.   2. Patient demonstrates independence with Postural Awareness.   3. Patient demonstrates independence with body mechanics.   4. Pt will improve (B) LE strength by at least 1/2 grade on MMT where deficits noted in order to improve functional mobility.  5. Pt will improve FOTO lumbar survey score to </= 25% limited in order to demo improved functional mobility.  6. Patient demonstrates increased lumbar range of motion by at least 10% pain free or greater to improve tolerance to functional activities.      Long Term Goals 12 weeks:    1. Pt will be independent with updated HEP.  2. Pt will improve (B) LE strength by at least 1/2 full grade on MMT where deficits noted in order to improve functional mobility  3. Pt will improve FOTO lumbar survey score to </= 18% limited in order to demo improved functional mobility  4. Pt will report </= 1/10 pain in lumbar region when performing ADLs in order to be able to perform ADLs with less difficulty    5. Patient demonstrates increased (B) UE strength by at least 1/2 full grade on MMT where deficits noted to improve tolerance to functional activities.   6. Patient demonstrates increased cervical range of motion by at least 25% pain free or greater to improve tolerance to functional activities.   7.  Patient demonstrates increased lumbar range of motion by at least 25% pain free or greater to improve tolerance to functional activities.    8. Patient demonstrates decreased doming of abdominal wall with functional movements for improved pressure management.  9. Patient demonstrates correct lifting technique 90% of the time for decreased pain and improved pressure management.    Plan     Plan of care Certification: 9/29/2023 to 12/30/2023.     Outpatient Physical Therapy 2 times weekly for 12 weeks to include the following interventions: Electrical Stimulation  , Manual Therapy, Moist Heat/ Ice, Neuromuscular Re-ed, Patient Education, Therapeutic Activities, and Therapeutic Exercise. Patient may be treated by a physical therapy assistant as part of their therapy plan    Renae Goodell, PTA,

## 2023-11-06 ENCOUNTER — CLINICAL SUPPORT (OUTPATIENT)
Dept: REHABILITATION | Facility: HOSPITAL | Age: 58
End: 2023-11-06
Payer: COMMERCIAL

## 2023-11-06 DIAGNOSIS — M54.2 CERVICAL PAIN (NECK): ICD-10-CM

## 2023-11-06 DIAGNOSIS — M54.50 LUMBAR PAIN: Primary | ICD-10-CM

## 2023-11-06 DIAGNOSIS — M43.6 NECK STIFFNESS: ICD-10-CM

## 2023-11-06 DIAGNOSIS — Z74.09 DECREASED FUNCTIONAL MOBILITY AND ENDURANCE: ICD-10-CM

## 2023-11-06 DIAGNOSIS — M62.08 DIASTASIS RECTI: ICD-10-CM

## 2023-11-06 PROCEDURE — 97110 THERAPEUTIC EXERCISES: CPT | Mod: PN,CQ

## 2023-11-06 NOTE — PROGRESS NOTES
I have reviewed the notes, assessments, and/or procedures performed by PTA, I concur with her/his documentation of John Salcido.    Monique Lr, PT, DPT    [facility]   Physical Therapy Treatment Note      Name: John Salcido  Clinic Number: [id number]    Therapy Diagnosis:   Encounter Diagnoses   Name Primary?    Lumbar pain Yes    Cervical pain (neck)     Neck stiffness     Decreased functional mobility and endurance     Diastasis recti          Physician: Shine Moore MD    Visit Date: 11/6/2023    Physician Orders: PT Eval and Treat   Medical Diagnosis from Referral: M51.36 (ICD-10-CM) - DDD (degenerative disc disease), lumbar  Evaluation Date: 9/29/2023  Authorization Period Expiration: 08/24/2024  Plan of Care Expiration: 12/30/2023  Progress Note Due: 10/29/2023  Visit # / Visits authorized: 1/ 20   FOTO: 2/ 3     Precautions: Standard      Time In: 1550  Time Out: 1635  Total Billable Time:  45 minutes    PTA Visit #: 3/5       Subjective     Patient reports: Patient reports that he is having low back pain but overall is feeling better.     He was compliant with home exercise program.  Response to previous treatment: patient felt great  Functional change: slight improvement in symptoms    Pain: 3/10  Location:  low back pain  Objective      Diastasis: 2.5 fingers above umbilicus, patient presents with significant doming     Cervical AROM: Pain/Dysfunction with Movement:   Flexion Full range of motion    Extension Full range of motion    Right side bending 50% motion, painful right side   Left side bending 50% motion   Right rotation 75% motion   Left rotation 75% motion      Lumbar AROM: Pain/Dysfunction with Movement:   Flexion 75% motion, stretch with motion   Extension 25% motion    Right side bending 75% motion, pain R side low back    Left side bending 75% motion    Right rotation 25% motion pain right side   Left rotation 40% motion pain external rotation side       UPPER EXTREMITY STRENGTH:    Left Right   Shoulder Flexion 4/5 4-/5   Shoulder Abduction 4-/5 4-/5   Shoulder Internal Rotation 4+/5 4+/5   Shoulder External Rotation 4+/5 4+/5         LOWER EXTREMITY STRENGTH:    Left Right   Quadriceps 5/5 5/5   Hamstrings 5/5 5/5      Iliopsoas 4-/5 4-/5   Abduction 4/5 4/5   Hip Ext 4-/5 4-/5         FLEXIBILITY: poor, patient sits with very stiff posture     Special Tests:    Left Right   Slump - -   SLR + +      GAIT: John ambulates with no assistive device independently.      GAIT DEVIATIONS: John displays decreased AP pelvic rotation     Intake Outcome Measure for FOTO Modified Oswestry LBP Disability Survey     Therapist reviewed FOTO scores for John Salcido on 9/29/2023.   FOTO report - see Media section or FOTO account episode details.     Intake Score: Initial on 9/29/23: 32%           Treatment     John received the treatments listed below:      therapeutic exercises to develop strength, endurance, ROM, flexibility, posture, and core stabilization for 45 minutes including:  Reassessment 15 minutes  - Clamshell 2x10 bilaterally red theraband   - Bridge with  2x10 reps  with red thera band  -SLR 2x10 reps bilaterally 2x10 with 2 sec hold  - Squat with touch at mat 2x10  - Rows with gray band 2x10     Not addressed Today:   - Lateral trunk rotation x30  reps bilaterally   - SKTC x15 with 5 sec hold each leg  - Quadratus Lumborum stretch bilaterally 2x10 sec hold bilaterally (Child's pose)forward, R and L  - Tranverse abdominus contraction 2 x10 with 5 sec hold  - Heel slide with SLR with eccentric control with TA bracing x10 reps bilaterally   -  Upper trap stretch 2x10 sec hold bilaterally  - L and R cervical rotation stretch with towel 3x10 sec hold  - standing forward flexion pose at mat table 1x20 sec hold  - Levator scapula stretch bilaterally 2x60 sec hold  - Sit to stand 2x5 with blue band above knees  - standing hip abduction and extension 2x12 reps  -  Pallof press 2 x 10 each side 17# freemotion machine  - Lat pulldown 13# 2x12 on freemotion machine  - HS stretch bilaterally 3x60 sec hold seated  - Forward trunk flexion seated 1 x 20 sec hold    manual therapy techniques: Soft tissue Mobilization were applied to the: R upper trap and posterior neck for 00 minutes, including: to relieve pain symptoms Improve ROM ROM  in neck/upper trap    neuromuscular re-education activities to improve: Balance, Coordination, and Posture for 00 minutes. The following activities were included:  Not addressed     therapeutic activities to improve functional performance for 00  minutes, including:  Not addressed     gait training to improve functional mobility and safety for 00  minutes, including:  Not addressed     direct contact modalities after being cleared for contraindications:Not addressed     supervised modalities after being cleared for contradictions: Not addressed     hot pack for 00 minutes to Not addressed .    cold pack for 00 minutes to Not addressed .    Patient Education and Home Exercises       Education provided:   - Issued Updated HEP     Written Home Exercises Provided: Patient instructed to cont prior HEP. Exercises were reviewed and John was able to demonstrate them prior to the end of the session.  John demonstrated good  understanding of the education provided. See Electronic Medical Record under Patient Instructions for exercises provided during therapy sessions    Assessment     Patient continues to progress with therapy goals but continues to have low back pain, and decreased pain in LE and UE'S. Will continue to work on progressing core, upper and lower extremity strengthening as tolerated. Updated HEP was issued with pictures and written instructions.   John Is progressing well towards his goals.   Patient prognosis is Good.     Patient will continue to benefit from skilled outpatient physical therapy to address the deficits listed in the problem  list box on initial evaluation, provide pt/family education and to maximize pt's level of independence in the home and community environment.     Patient's spiritual, cultural and educational needs considered and pt agreeable to plan of care and goals.     Anticipated barriers to physical therapy: none    Goals:   Short Term Goals 6 weeks:    1. Patient demonstrates independence with HEP. Goal Met  2. Patient demonstrates independence with Postural Awareness. Goal Progressing  3. Patient demonstrates independence with body mechanics. Goal Progressing  4. Pt will improve (B) LE strength by at least 1/2 grade on MMT where deficits noted in order to improve functional mobility. Goal Progressing   5. Pt will improve FOTO lumbar survey score to </= 25% limited in order to demo improved functional mobility.  6. Patient demonstrates increased lumbar range of motion by at least 10% pain free or greater to improve tolerance to functional activities. Goal Progressing      Long Term Goals 12 weeks:    1. Pt will be independent with updated HEP.  2. Pt will improve (B) LE strength by at least 1/2 full grade on MMT where deficits noted in order to improve functional mobility  3. Pt will improve FOTO lumbar survey score to </= 18% limited in order to demo improved functional mobility  4. Pt will report </= 1/10 pain in lumbar region when performing ADLs in order to be able to perform ADLs with less difficulty    5. Patient demonstrates increased (B) UE strength by at least 1/2 full grade on MMT where deficits noted to improve tolerance to functional activities.   6. Patient demonstrates increased cervical range of motion by at least 25% pain free or greater to improve tolerance to functional activities.   7.  Patient demonstrates increased lumbar range of motion by at least 25% pain free or greater to improve tolerance to functional activities.   8. Patient demonstrates decreased doming of abdominal wall with functional movements  for improved pressure management.  9. Patient demonstrates correct lifting technique 90% of the time for decreased pain and improved pressure management.    Plan     Plan of care Certification: 9/29/2023 to 12/30/2023.     Outpatient Physical Therapy 2 times weekly for 12 weeks to include the following interventions: Electrical Stimulation  , Manual Therapy, Moist Heat/ Ice, Neuromuscular Re-ed, Patient Education, Therapeutic Activities, and Therapeutic Exercise. Patient may be treated by a physical therapy assistant as part of their therapy plan    Renae Goodell, PTA,

## 2023-11-16 ENCOUNTER — OFFICE VISIT (OUTPATIENT)
Dept: HEPATOLOGY | Facility: CLINIC | Age: 58
End: 2023-11-16
Payer: COMMERCIAL

## 2023-11-16 ENCOUNTER — PATIENT MESSAGE (OUTPATIENT)
Dept: UROLOGY | Facility: CLINIC | Age: 58
End: 2023-11-16
Payer: COMMERCIAL

## 2023-11-16 ENCOUNTER — LAB VISIT (OUTPATIENT)
Dept: LAB | Facility: HOSPITAL | Age: 58
End: 2023-11-16
Payer: COMMERCIAL

## 2023-11-16 VITALS — HEIGHT: 68 IN | WEIGHT: 219.38 LBS | BODY MASS INDEX: 33.25 KG/M2

## 2023-11-16 DIAGNOSIS — E66.9 CLASS 1 OBESITY WITH BODY MASS INDEX (BMI) OF 33.0 TO 33.9 IN ADULT, UNSPECIFIED OBESITY TYPE, UNSPECIFIED WHETHER SERIOUS COMORBIDITY PRESENT: ICD-10-CM

## 2023-11-16 DIAGNOSIS — K76.0 NAFLD (NONALCOHOLIC FATTY LIVER DISEASE): Primary | ICD-10-CM

## 2023-11-16 DIAGNOSIS — K76.0 NAFLD (NONALCOHOLIC FATTY LIVER DISEASE): ICD-10-CM

## 2023-11-16 DIAGNOSIS — R74.8 ELEVATED LIVER ENZYMES: ICD-10-CM

## 2023-11-16 DIAGNOSIS — E78.5 HYPERLIPIDEMIA, UNSPECIFIED HYPERLIPIDEMIA TYPE: ICD-10-CM

## 2023-11-16 LAB
ALBUMIN SERPL BCP-MCNC: 4.4 G/DL (ref 3.5–5.2)
ALP SERPL-CCNC: 84 U/L (ref 55–135)
ALT SERPL W/O P-5'-P-CCNC: 89 U/L (ref 10–44)
AST SERPL-CCNC: 52 U/L (ref 10–40)
BILIRUB DIRECT SERPL-MCNC: 0.5 MG/DL (ref 0.1–0.3)
BILIRUB SERPL-MCNC: 1.7 MG/DL (ref 0.1–1)
ERYTHROCYTE [DISTWIDTH] IN BLOOD BY AUTOMATED COUNT: 13.1 % (ref 11.5–14.5)
ESTIMATED AVG GLUCOSE: 108 MG/DL (ref 68–131)
FERRITIN SERPL-MCNC: 163 NG/ML (ref 20–300)
HAV IGG SER QL IA: NORMAL
HBA1C MFR BLD: 5.4 % (ref 4–5.6)
HBV CORE AB SERPL QL IA: NORMAL
HBV SURFACE AB SER-ACNC: <3 MIU/ML
HBV SURFACE AB SER-ACNC: NORMAL M[IU]/ML
HBV SURFACE AG SERPL QL IA: NORMAL
HCT VFR BLD AUTO: 46.2 % (ref 40–54)
HCV AB SERPL QL IA: NORMAL
HGB BLD-MCNC: 16.3 G/DL (ref 14–18)
IRON SERPL-MCNC: 155 UG/DL (ref 45–160)
MCH RBC QN AUTO: 30.7 PG (ref 27–31)
MCHC RBC AUTO-ENTMCNC: 35.3 G/DL (ref 32–36)
MCV RBC AUTO: 87 FL (ref 82–98)
PLATELET # BLD AUTO: 160 K/UL (ref 150–450)
PMV BLD AUTO: 10.2 FL (ref 9.2–12.9)
PROT SERPL-MCNC: 7.4 G/DL (ref 6–8.4)
RBC # BLD AUTO: 5.31 M/UL (ref 4.6–6.2)
SATURATED IRON: 38 % (ref 20–50)
TOTAL IRON BINDING CAPACITY: 408 UG/DL (ref 250–450)
TRANSFERRIN SERPL-MCNC: 276 MG/DL (ref 200–375)
WBC # BLD AUTO: 5.37 K/UL (ref 3.9–12.7)

## 2023-11-16 PROCEDURE — 86790 VIRUS ANTIBODY NOS: CPT | Performed by: NURSE PRACTITIONER

## 2023-11-16 PROCEDURE — 80076 HEPATIC FUNCTION PANEL: CPT | Performed by: NURSE PRACTITIONER

## 2023-11-16 PROCEDURE — 83036 HEMOGLOBIN GLYCOSYLATED A1C: CPT | Performed by: NURSE PRACTITIONER

## 2023-11-16 PROCEDURE — 3008F PR BODY MASS INDEX (BMI) DOCUMENTED: ICD-10-PCS | Mod: CPTII,S$GLB,, | Performed by: NURSE PRACTITIONER

## 2023-11-16 PROCEDURE — 36415 COLL VENOUS BLD VENIPUNCTURE: CPT | Performed by: NURSE PRACTITIONER

## 2023-11-16 PROCEDURE — 99999 PR PBB SHADOW E&M-EST. PATIENT-LVL III: ICD-10-PCS | Mod: PBBFAC,,, | Performed by: NURSE PRACTITIONER

## 2023-11-16 PROCEDURE — 99204 OFFICE O/P NEW MOD 45 MIN: CPT | Mod: S$GLB,,, | Performed by: NURSE PRACTITIONER

## 2023-11-16 PROCEDURE — 3044F PR MOST RECENT HEMOGLOBIN A1C LEVEL <7.0%: ICD-10-PCS | Mod: CPTII,S$GLB,, | Performed by: NURSE PRACTITIONER

## 2023-11-16 PROCEDURE — 82728 ASSAY OF FERRITIN: CPT | Performed by: NURSE PRACTITIONER

## 2023-11-16 PROCEDURE — 86704 HEP B CORE ANTIBODY TOTAL: CPT | Performed by: NURSE PRACTITIONER

## 2023-11-16 PROCEDURE — 86706 HEP B SURFACE ANTIBODY: CPT | Performed by: NURSE PRACTITIONER

## 2023-11-16 PROCEDURE — 85027 COMPLETE CBC AUTOMATED: CPT | Performed by: NURSE PRACTITIONER

## 2023-11-16 PROCEDURE — 3008F BODY MASS INDEX DOCD: CPT | Mod: CPTII,S$GLB,, | Performed by: NURSE PRACTITIONER

## 2023-11-16 PROCEDURE — 99204 PR OFFICE/OUTPT VISIT, NEW, LEVL IV, 45-59 MIN: ICD-10-PCS | Mod: S$GLB,,, | Performed by: NURSE PRACTITIONER

## 2023-11-16 PROCEDURE — 3044F HG A1C LEVEL LT 7.0%: CPT | Mod: CPTII,S$GLB,, | Performed by: NURSE PRACTITIONER

## 2023-11-16 PROCEDURE — 86803 HEPATITIS C AB TEST: CPT | Performed by: NURSE PRACTITIONER

## 2023-11-16 PROCEDURE — 83540 ASSAY OF IRON: CPT | Performed by: NURSE PRACTITIONER

## 2023-11-16 PROCEDURE — 87340 HEPATITIS B SURFACE AG IA: CPT | Performed by: NURSE PRACTITIONER

## 2023-11-16 PROCEDURE — 99999 PR PBB SHADOW E&M-EST. PATIENT-LVL III: CPT | Mod: PBBFAC,,, | Performed by: NURSE PRACTITIONER

## 2023-11-16 PROCEDURE — 84466 ASSAY OF TRANSFERRIN: CPT | Performed by: NURSE PRACTITIONER

## 2023-11-16 NOTE — PATIENT INSTRUCTIONS
Labs to monitor liver tests and rule out a few other causes of liver problems  Fibroscan to assess for liver scarring/fibrosis from fatty liver        FATTY LIVER EDUCATION:  There is no FDA approved therapy for non-alcoholic fatty liver disease (NAFLD); therefore, lifestyle changes are important:  1. Weight loss goal of 20 lbs    *Weight loss of 5% has been shown to reduce fat in the liver  *Weight loss of 7-10% has been shown to improve fatty liver, inflammation from fatty liver, and liver fibrosis (scarring/damage)    2. Decreasing daily calories is recommended for weight loss. Try to limit carbohydrate intake to LESS than 30-45 grams of carbs with a meal and LESS than 5-10 grams of carbs with a snack. Avoid drinking beverages with sugar/carbohydrates. Meeting with a dietician or using one of the weight loss apps below can be helpful to determine individualized calorie goals and add up carbs throughout the day. Look for snacks high in protein, low in carbohydrates/sugar.    3. Exercise as tolerated. Studies have shown that exercise improves fatty liver even without weight loss.     4. Recommend good control of blood pressure, cholesterol, and blood sugar levels as these are risk factors for fatty liver. Cholesterol lowering medications including statins are typically safe and beneficial (even if liver enzymes are elevated).    5. Limit alcohol consumption, no more than 2 serving(s) of alcohol in any day (1 serving is 5 ounces of wine, 12 ounces of beer, or 1.5 ounces of liquor). Do not recommend daily alcohol use or drinking alcohol most days per week.    In some people, fatty liver can progress to steatohepatitis (inflamatory fatty liver). This can cause liver scarring or damage (fibrosis) and possibly to cirrhosis. Cirrhosis increases risk of liver cancer and liver failure. Lifestyle changes now can help to decrease this risk.     Ask about our fatty liver/MEDINA clinical trials if you have fibrosis / scar tissue  related to fatty liver.        Additional Resources:    Websites with information about fatty liver and inflammation related to fatty liver (MEDINA): www.nashtruth.com and www.nashactually.com   [facility]: Non-Alcoholic Fatty Liver Disease (NAFLD)    Facebook support group with tips and recipes:   Non-Alcoholic Fatty Liver Disease (NAFLD) Diet & Nutrition Support     Can download [employer] Pal or Lose It paulo to add up your carbohydrates throughout the day.      Try www.Everypost for recipes.    If interested in seeing a dietician to create a weight loss plan, contact the dietician team at [facility]: [email or web address].  You can also call to schedule a consult with a dietician: [phone]  *Virtual visits are available with one of our dieticians.     If you have diabetes or high blood pressure, you can meet with a Health  through [facility]. Let us know if you are interested in a referral.     Let us know if you are interested in a referral to [facility].

## 2023-11-16 NOTE — PROGRESS NOTES
[facility] - New Patient     REFERRING PROVIDER: Paulrefarvind Self  PCP: Herber Hernandez MD    Chief Complaint: Fatty liver      HISTORY     This is a 58 y.o. male referred for evaluation of fatty liver.     He was first told about having fatty liver in his 20s or 30s.   This ws first noted on CT in our system 10/24/23- liver with hepatic steatosis though WNL and no liver lesions; no findings to suggest advanced fibrosis.     Review of labs:  - Transaminases: elevated since at least 2018, ALT>AST, <100  - Alk phos: WNL  - Synthetic liver function: TBili chronically elevated, <2. LFTs otherwise WNL  - Platelets: 150-160s    Previously told that he had Gilbert's.    Risk factors for fatty liver:   Weight -- Body mass index is 33.35 kg/m².    Weight has been trending up over the years (200 -- 220 lb)                      Dyslipidemia -- yes  On statin                               Insulin resistance / diabetes -- no, though no HgbA1c for review       Hypertension -- yes   Alcohol use -- once every 2-3 weeks, 6 pack     Family history:  Dad and uncles with cirrhosis and liver CA. Some drank alcohol heavily. One uncle had a liver transplant.     Meds/supplements:  -Rx: none concerning from liver standpoint  -OTC, herbs/vitamins: apple cider vinegar, milk thistle   No recent medication changes.     Social history:  Drug use: no  Exercise: not strenuous; no myalgias     Denies recent illness or infection.    No symptoms of hepatic decompensation including jaundice, ascites, cognitive problems to suggest hepatic encephalopathy, or GI bleeding.         Past medical history, surgical history, problem list, family history, social history, allergies: Reviewed and updated in the appropriate section of the electronic medical record.      Current Outpatient Medications   Medication Sig Dispense Refill    allopurinol (ZYLOPRIM) 300 MG tablet Take 300 mg by mouth once daily.      dexlansoprazole (DEXILANT) 60 mg  capsule Take 1 capsule by mouth every morning.      metoprolol succinate (TOPROL-XL) 50 MG 24 hr tablet Take 50 mg by mouth 2 (two) times daily.      simvastatin (ZOCOR) 20 MG tablet Take 20 mg by mouth once daily.       No current facility-administered medications for this visit.     Medication list reviewed and updated.      Review of Systems - as per HPI  Constitutional: Negative for fatigue or unexpected weight change.   Respiratory: Negative for shortness of breath.    Cardiovascular: Negative for leg swelling.  Gastrointestinal: Negative for abdominal distention or abdominal pain. Negative for melena or hematemesis.  Musculoskeletal: Negative for myalgias.    Skin: Negative for jaundice or itching.  Neurological: Negative for confusion or slowed mentation. Negative for tremors.   Hematological: Does not bruise/bleed easily.   Psychiatric: Negative for sleep disturbance.      Physical Exam   Constitutional: Well-nourished. No distress. Alert and oriented.  Eyes: No scleral icterus.   Pulmonary/Chest: Respiratory effort normal. No respiratory distress.   Abdominal: No distension, no ascites appreciated.   Extremities: No edema.   Neurological: No tremor.   Skin: No jaundice. No spider telangiectasias.  Psychiatric: Normal mood and affect. Speech, behavior, and thought content normal.       LABS & DIAGNOSTIC STUDIES     I have personally reviewed pertinent laboratory findings:    Lab Results   Component Value Date    ALT 89 (H) 11/16/2023    AST 52 (H) 11/16/2023    ALKPHOS 84 11/16/2023    BILITOT 1.7 (H) 11/16/2023    ALBUMIN 4.4 11/16/2023       Lab Results   Component Value Date    WBC 5.37 11/16/2023    HGB 16.3 11/16/2023    HCT 46.2 11/16/2023    MCV 87 11/16/2023     11/16/2023       Lab Results   Component Value Date     10/24/2023    K 4.4 10/24/2023    BUN 12 10/24/2023    CREATININE 0.8 11/28/2023    ESTGFRAFRICA >60 09/12/2018    EGFRNONAA >60 09/12/2018       Lab Results   Component  "Value Date    FERRITIN 163 11/16/2023    FESATURATED 38 11/16/2023    HEPBSAG Non-reactive 11/16/2023    HEPBCAB Non-reactive 11/16/2023    HEPCAB Non-reactive 11/16/2023       No results found for: "AFP"    I have personally reviewed the following result reports:  CT - 10/24/23      ASSESSMENT & PLAN     58 y.o. male with:    1. NAFLD, elevated liver enzymes  -- Obtain Fibroscan for fibrosis and steatosis staging  -- Elevated liver enzymes are likely due to fatty liver though will start serologic workup to rule out other causes. Anticipate improvement in liver enzymes with weight loss.    Fatty liver discussion:  - Reviewed risk of hepatic inflammation and subsequent fibrosis from fatty liver.  - At this time, the only treatment for fatty liver is weight loss and maintaining good control of risk factors (blood pressure, cholesterol, and blood sugar).   - Alcohol use is also a risk factor for fatty liver. If no advanced fibrosis, should limit alcohol to max 2 standard drinks/day. Do not recommend daily alcohol use or drinking alcohol most days per week.     FIB-4 Calculation: 2.03 at 12/4/2023 12:44 PM     FIB-4 below 1.30 is considered as low-risk for advanced fibrosis  FIB-4 over 2.67 is considered as high-risk for advanced fibrosis  FIB-4 values between 1.30 and 2.67 are considered as intermediate-risk of advanced fibrosis for ages 36-64.     2. Obesity with Body mass index is 33.35 kg/m²., HTN, HLD  -- Reviewed weight loss strategies including dietary changes and physical activity. Recommend low carbohydrate/sugar, high protein/fiber diet. Recommend long-term weight loss goal of 10% (about 20 lb).   -- We discussed additional weight loss resources including dietician consult and bariatric medicine consult.       Orders Placed This Encounter   Procedures    FibroScan [place] (Vibration Controlled Transient Elastography)    Hepatic Function Panel    Ferritin    Iron and TIBC    Hepatitis A antibody, IgG    " Hepatitis B Core Antibody, Total    Hepatitis B Surface Ab, Qualitative    Hepatitis B Surface Antigen    Hepatitis C Antibody    CBC Without Differential    Hemoglobin A1C       *See AVS for patient education and instructions.       Return to clinic after labs/Fibroscan.      Thank you for allowing me to participate in the care of John Buitrago, [facility]  Hepatology        Duration of encounter: 45 min  This includes face to face time and non-face to face time preparing to see the patient (eg, review of tests), obtaining and/or reviewing separately obtained history, documenting clinical information in the electronic or other health record, independently interpreting results and communicating results to the patient/family/caregiver, or Care coordination.

## 2023-11-20 ENCOUNTER — CLINICAL SUPPORT (OUTPATIENT)
Dept: REHABILITATION | Facility: HOSPITAL | Age: 58
End: 2023-11-20
Payer: COMMERCIAL

## 2023-11-20 DIAGNOSIS — M62.08 DIASTASIS RECTI: ICD-10-CM

## 2023-11-20 DIAGNOSIS — M43.6 NECK STIFFNESS: ICD-10-CM

## 2023-11-20 DIAGNOSIS — M54.50 LUMBAR PAIN: Primary | ICD-10-CM

## 2023-11-20 DIAGNOSIS — M54.2 CERVICAL PAIN (NECK): ICD-10-CM

## 2023-11-20 DIAGNOSIS — Z74.09 DECREASED FUNCTIONAL MOBILITY AND ENDURANCE: ICD-10-CM

## 2023-11-20 PROCEDURE — 97110 THERAPEUTIC EXERCISES: CPT | Mod: PN,CQ

## 2023-11-20 NOTE — PROGRESS NOTES
Dictation #1  MRN:[id number]  CSN:[id number] I have reviewed the notes, assessments, and/or procedures performed by PTA, I concur with her/his documentation of John Salcido.    Monique Lr, PT, DPT    [facility]   Physical Therapy Treatment Note      Name: John Salcido  Clinic Number: [id number]    Therapy Diagnosis:   Encounter Diagnoses   Name Primary?    Lumbar pain Yes    Cervical pain (neck)     Neck stiffness     Decreased functional mobility and endurance     Diastasis recti          Physician: Shine Moore MD    Visit Date: 11/6/2023    Physician Orders: PT Eval and Treat   Medical Diagnosis from Referral: M51.36 (ICD-10-CM) - DDD (degenerative disc disease), lumbar  Evaluation Date: 9/29/2023  Authorization Period Expiration: 08/24/2024  Plan of Care Expiration: 12/30/2023  Progress Note Due: 10/29/2023  Visit # / Visits authorized: 6/ 20   FOTO: 2/ 3     Precautions: Standard      Time In: 1525 (10 minutes late)  Time Out: 1600  Total Billable Time:  35 minutes    PTA Visit #: 2/5       Subjective     Patient reports:that his low back on his R and R shoulder     He was compliant with home exercise program.  Response to previous treatment: patient felt great  Functional change: slight improvement in symptoms    Pain: 5/10  Location:  low back pain R shoulder and R side of neck  Objective      Diastasis: 2.5 fingers above umbilicus, patient presents with significant doming     Cervical AROM: Pain/Dysfunction with Movement:   Flexion Full range of motion    Extension Full range of motion    Right side bending 50% motion, painful right side   Left side bending 50% motion   Right rotation 75% motion   Left rotation 75% motion      Lumbar AROM: Pain/Dysfunction with Movement:   Flexion 75% motion, stretch with motion   Extension 25% motion    Right side bending 75% motion, pain R side low back    Left side bending 75% motion    Right rotation 25% motion pain right side   Left  rotation 40% motion pain external rotation side      UPPER EXTREMITY STRENGTH:    Left Right   Shoulder Flexion 4/5 4-/5   Shoulder Abduction 4-/5 4-/5   Shoulder Internal Rotation 4+/5 4+/5   Shoulder External Rotation 4+/5 4+/5         LOWER EXTREMITY STRENGTH:    Left Right   Quadriceps 5/5 5/5   Hamstrings 5/5 5/5      Iliopsoas 4-/5 4-/5   Abduction 4/5 4/5   Hip Ext 4-/5 4-/5         FLEXIBILITY: poor, patient sits with very stiff posture     Special Tests:    Left Right   Slump - -   SLR + +      GAIT: John ambulates with no assistive device independently.      GAIT DEVIATIONS: John displays decreased AP pelvic rotation     Intake Outcome Measure for FOTO Modified Oswestry LBP Disability Survey     Therapist reviewed FOTO scores for John Salcido on 9/29/2023.   FOTO report - see Media section or FOTO account episode details.     Intake Score: Initial on 9/29/23: 32%           Treatment     John received the treatments listed below:      therapeutic exercises to develop strength, endurance, ROM, flexibility, posture, and core stabilization for 35 minutes including:  - Piriformis stretching 3x20 sec hold  - Clamshell 2x10 bilaterally red theraband   - Bridge with  2x20 reps  with   blue TB for isometric hip abduction   -SLR 2x10 reps bilaterally 2x10 with 2 sec hold  - Squat with touch at mat 2x10  - Rows with 7# at freemotion machine   3x10   - Palloff press 10# x20 reps bilaterally     Not addressed Today:   - Lateral trunk rotation x30  reps bilaterally   - SKTC x15 with 5 sec hold each leg  - Quadratus Lumborum stretch bilaterally 2x10 sec hold bilaterally (Child's pose)forward, R and L  - Tranverse abdominus contraction 2 x10 with 5 sec hold  - Heel slide with SLR with eccentric control with TA bracing x10 reps bilaterally   -  Upper trap stretch 2x10 sec hold bilaterally  - L and R cervical rotation stretch with towel 3x10 sec hold  - standing forward flexion pose at mat table 1x20 sec hold  -  Levator scapula stretch bilaterally 2x60 sec hold  - Sit to stand 2x5 with blue band above knees  - standing hip abduction and extension 2x12 reps  - Pallof press 2 x 10 each side 17# freemotion machine  - Lat pulldown 13# 2x12 on freemotion machine  - HS stretch bilaterally 3x60 sec hold seated  - Forward trunk flexion seated 1 x 20 sec hold    manual therapy techniques: Soft tissue Mobilization were applied to the: R upper trap and posterior neck for 00 minutes, including: to relieve pain symptoms Improve ROM ROM  in neck/upper trap    neuromuscular re-education activities to improve: Balance, Coordination, and Posture for 00 minutes. The following activities were included:  Not addressed     therapeutic activities to improve functional performance for 00  minutes, including:  Not addressed     gait training to improve functional mobility and safety for 00  minutes, including:  Not addressed     direct contact modalities after being cleared for contraindications:Not addressed     supervised modalities after being cleared for contradictions: Not addressed     hot pack for 00 minutes to Not addressed .    cold pack for 00 minutes to Not addressed .    Patient Education and Home Exercises       Education provided:   - Issued Updated HEP     Written Home Exercises Provided: Patient instructed to cont prior HEP. Exercises were reviewed and John was able to demonstrate them prior to the end of the session.  John demonstrated good  understanding of the education provided. See Electronic Medical Record under Patient Instructions for exercises provided during therapy sessions    Assessment     Patient continues to progress with therapy goals but continues to have low back pain, R shoulder and neck pain.  Will continue to work on progressing core, upper and lower extremity strengthening as tolerated. Updated HEP was issued with pictures and written instructions.   John Is progressing well towards his goals.   Patient  prognosis is Good.     Patient will continue to benefit from skilled outpatient physical therapy to address the deficits listed in the problem list box on initial evaluation, provide pt/family education and to maximize pt's level of independence in the home and community environment.     Patient's spiritual, cultural and educational needs considered and pt agreeable to plan of care and goals.     Anticipated barriers to physical therapy: none    Goals:   Short Term Goals 6 weeks:    1. Patient demonstrates independence with HEP. Goal Met  2. Patient demonstrates independence with Postural Awareness. Goal Progressing  3. Patient demonstrates independence with body mechanics. Goal Progressing  4. Pt will improve (B) LE strength by at least 1/2 grade on MMT where deficits noted in order to improve functional mobility. Goal Progressing   5. Pt will improve FOTO lumbar survey score to </= 25% limited in order to demo improved functional mobility.  6. Patient demonstrates increased lumbar range of motion by at least 10% pain free or greater to improve tolerance to functional activities. Goal Progressing      Long Term Goals 12 weeks:    1. Pt will be independent with updated HEP.  2. Pt will improve (B) LE strength by at least 1/2 full grade on MMT where deficits noted in order to improve functional mobility  3. Pt will improve FOTO lumbar survey score to </= 18% limited in order to demo improved functional mobility  4. Pt will report </= 1/10 pain in lumbar region when performing ADLs in order to be able to perform ADLs with less difficulty    5. Patient demonstrates increased (B) UE strength by at least 1/2 full grade on MMT where deficits noted to improve tolerance to functional activities.   6. Patient demonstrates increased cervical range of motion by at least 25% pain free or greater to improve tolerance to functional activities.   7.  Patient demonstrates increased lumbar range of motion by at least 25% pain free  or greater to improve tolerance to functional activities.   8. Patient demonstrates decreased doming of abdominal wall with functional movements for improved pressure management.  9. Patient demonstrates correct lifting technique 90% of the time for decreased pain and improved pressure management.    Plan     Plan of care Certification: 9/29/2023 to 12/30/2023.     Outpatient Physical Therapy 2 times weekly for 12 weeks to include the following interventions: Electrical Stimulation  , Manual Therapy, Moist Heat/ Ice, Neuromuscular Re-ed, Patient Education, Therapeutic Activities, and Therapeutic Exercise. Patient may be treated by a physical therapy assistant as part of their therapy plan    Renae Goodell, PTA,

## 2023-11-20 NOTE — PROGRESS NOTES
[facility]   Physical Therapy Treatment Note      Name: John Salcido  Clinic Number: [id number]    Therapy Diagnosis:   No diagnosis found.        Physician: Shine Moore MD    Visit Date: 11/20/2023    Physician Orders: PT Eval and Treat   Medical Diagnosis from Referral: M51.36 (ICD-10-CM) - DDD (degenerative disc disease), lumbar  Evaluation Date: 9/29/2023  Authorization Period Expiration: 08/24/2024  Plan of Care Expiration: 12/30/2023  Progress Note Due: 10/29/2023  Visit # / Visits authorized: 6 / 20   FOTO: 2/ 3     Precautions: Standard      Time In: 1550  Time Out: 1635  Total Billable Time:  45 minutes    PTA Visit #: 0/5       Subjective     Patient reports: Patient reports that he is having low back pain but overall is feeling better.     He was compliant with home exercise program.  Response to previous treatment: patient felt great  Functional change: slight improvement in symptoms    Pain: 3/10  Location:  low back pain  Objective      Diastasis: 2.5 fingers above umbilicus, patient presents with significant doming     Cervical AROM: Pain/Dysfunction with Movement:   Flexion Full range of motion    Extension Full range of motion    Right side bending 50% motion, painful right side   Left side bending 50% motion   Right rotation 75% motion   Left rotation 75% motion      Lumbar AROM: Pain/Dysfunction with Movement:   Flexion 75% motion, stretch with motion   Extension 25% motion    Right side bending 75% motion, pain R side low back    Left side bending 75% motion    Right rotation 25% motion pain right side   Left rotation 40% motion pain external rotation side      UPPER EXTREMITY STRENGTH:    Left Right   Shoulder Flexion 4/5 4-/5   Shoulder Abduction 4-/5 4-/5   Shoulder Internal Rotation 4+/5 4+/5   Shoulder External Rotation 4+/5 4+/5         LOWER EXTREMITY STRENGTH:    Left Right   Quadriceps 5/5 5/5   Hamstrings 5/5 5/5      Iliopsoas 4-/5 4-/5   Abduction 4/5  4/5   Hip Ext 4-/5 4-/5         FLEXIBILITY: poor, patient sits with very stiff posture     Special Tests:    Left Right   Slump - -   SLR + +      GAIT: John ambulates with no assistive device independently.      GAIT DEVIATIONS: John displays decreased AP pelvic rotation     Intake Outcome Measure for FOTO Modified Oswestry LBP Disability Survey     Therapist reviewed FOTO scores for John Salcido on 9/29/2023.   FOTO report - see Media section or FOTO account episode details.     Intake Score: Initial on 9/29/23: 32%           Treatment     John received the treatments listed below:      therapeutic exercises to develop strength, endurance, ROM, flexibility, posture, and core stabilization for 45 minutes including:  Reassessment 15 minutes  - Clamshell 2x10 bilaterally red theraband   - Bridge with  2x10 reps  with red thera band  -SLR 2x10 reps bilaterally 2x10 with 2 sec hold  - Squat with touch at mat 2x10  - Rows with gray band 2x10     Not addressed Today:   - Lateral trunk rotation x30  reps bilaterally   - SKTC x15 with 5 sec hold each leg  - Quadratus Lumborum stretch bilaterally 2x10 sec hold bilaterally (Child's pose)forward, R and L  - Tranverse abdominus contraction 2 x10 with 5 sec hold  - Heel slide with SLR with eccentric control with TA bracing x10 reps bilaterally   -  Upper trap stretch 2x10 sec hold bilaterally  - L and R cervical rotation stretch with towel 3x10 sec hold  - standing forward flexion pose at mat table 1x20 sec hold  - Levator scapula stretch bilaterally 2x60 sec hold  - Sit to stand 2x5 with blue band above knees  - standing hip abduction and extension 2x12 reps  - Pallof press 2 x 10 each side 17# freemotion machine  - Lat pulldown 13# 2x12 on freemotion machine  - HS stretch bilaterally 3x60 sec hold seated  - Forward trunk flexion seated 1 x 20 sec hold    manual therapy techniques: Soft tissue Mobilization were applied to the: R upper trap and posterior neck for 00  minutes, including: to relieve pain symptoms Improve ROM ROM  in neck/upper trap    neuromuscular re-education activities to improve: Balance, Coordination, and Posture for 00 minutes. The following activities were included:  Not addressed     therapeutic activities to improve functional performance for 00  minutes, including:  Not addressed     gait training to improve functional mobility and safety for 00  minutes, including:  Not addressed     direct contact modalities after being cleared for contraindications:Not addressed     supervised modalities after being cleared for contradictions: Not addressed     hot pack for 00 minutes to Not addressed .    cold pack for 00 minutes to Not addressed .    Patient Education and Home Exercises       Education provided:   - Issued Updated HEP     Written Home Exercises Provided: Patient instructed to cont prior HEP. Exercises were reviewed and John was able to demonstrate them prior to the end of the session.  John demonstrated good  understanding of the education provided. See Electronic Medical Record under Patient Instructions for exercises provided during therapy sessions    Assessment     Patient continues to progress with therapy goals but continues to have low back pain, and decreased pain in LE and UE'S. Will continue to work on progressing core, upper and lower extremity strengthening as tolerated. Updated HEP was issued with pictures and written instructions.   John Is progressing well towards his goals.   Patient prognosis is Good.     Patient will continue to benefit from skilled outpatient physical therapy to address the deficits listed in the problem list box on initial evaluation, provide pt/family education and to maximize pt's level of independence in the home and community environment.     Patient's spiritual, cultural and educational needs considered and pt agreeable to plan of care and goals.     Anticipated barriers to physical therapy:  none    Goals:   Short Term Goals 6 weeks:    1. Patient demonstrates independence with HEP. Goal Met  2. Patient demonstrates independence with Postural Awareness. Goal Progressing  3. Patient demonstrates independence with body mechanics. Goal Progressing  4. Pt will improve (B) LE strength by at least 1/2 grade on MMT where deficits noted in order to improve functional mobility. Goal Progressing   5. Pt will improve FOTO lumbar survey score to </= 25% limited in order to demo improved functional mobility.  6. Patient demonstrates increased lumbar range of motion by at least 10% pain free or greater to improve tolerance to functional activities. Goal Progressing      Long Term Goals 12 weeks:    1. Pt will be independent with updated HEP.  2. Pt will improve (B) LE strength by at least 1/2 full grade on MMT where deficits noted in order to improve functional mobility  3. Pt will improve FOTO lumbar survey score to </= 18% limited in order to demo improved functional mobility  4. Pt will report </= 1/10 pain in lumbar region when performing ADLs in order to be able to perform ADLs with less difficulty    5. Patient demonstrates increased (B) UE strength by at least 1/2 full grade on MMT where deficits noted to improve tolerance to functional activities.   6. Patient demonstrates increased cervical range of motion by at least 25% pain free or greater to improve tolerance to functional activities.   7.  Patient demonstrates increased lumbar range of motion by at least 25% pain free or greater to improve tolerance to functional activities.   8. Patient demonstrates decreased doming of abdominal wall with functional movements for improved pressure management.  9. Patient demonstrates correct lifting technique 90% of the time for decreased pain and improved pressure management.    Plan     Plan of care Certification: 9/29/2023 to 12/30/2023.     Outpatient Physical Therapy 2 times weekly for 12 weeks to include the  following interventions: Electrical Stimulation  , Manual Therapy, Moist Heat/ Ice, Neuromuscular Re-ed, Patient Education, Therapeutic Activities, and Therapeutic Exercise. Patient may be treated by a physical therapy assistant as part of their therapy plan    Andrei Adams PT,

## 2023-11-28 ENCOUNTER — OFFICE VISIT (OUTPATIENT)
Dept: UROLOGY | Facility: CLINIC | Age: 58
End: 2023-11-28
Payer: COMMERCIAL

## 2023-11-28 ENCOUNTER — PROCEDURE VISIT (OUTPATIENT)
Dept: HEPATOLOGY | Facility: CLINIC | Age: 58
End: 2023-11-28
Payer: COMMERCIAL

## 2023-11-28 ENCOUNTER — HOSPITAL ENCOUNTER (OUTPATIENT)
Dept: RADIOLOGY | Facility: HOSPITAL | Age: 58
Discharge: HOME | End: 2023-11-28
Attending: NURSE PRACTITIONER
Payer: COMMERCIAL

## 2023-11-28 VITALS
HEART RATE: 60 BPM | SYSTOLIC BLOOD PRESSURE: 145 MMHG | WEIGHT: 217.06 LBS | BODY MASS INDEX: 32.9 KG/M2 | DIASTOLIC BLOOD PRESSURE: 90 MMHG | HEIGHT: 68 IN

## 2023-11-28 DIAGNOSIS — N20.0 BILATERAL KIDNEY STONES: Primary | ICD-10-CM

## 2023-11-28 DIAGNOSIS — N20.0 KIDNEY STONES: ICD-10-CM

## 2023-11-28 DIAGNOSIS — K76.0 NAFLD (NONALCOHOLIC FATTY LIVER DISEASE): ICD-10-CM

## 2023-11-28 DIAGNOSIS — N52.9 ED (ERECTILE DYSFUNCTION) OF ORGANIC ORIGIN: ICD-10-CM

## 2023-11-28 DIAGNOSIS — R36.1 HEMATOSPERMIA: ICD-10-CM

## 2023-11-28 PROCEDURE — 3077F SYST BP >= 140 MM HG: CPT | Mod: CPTII,S$GLB,, | Performed by: NURSE PRACTITIONER

## 2023-11-28 PROCEDURE — 1160F RVW MEDS BY RX/DR IN RCRD: CPT | Mod: CPTII,S$GLB,, | Performed by: NURSE PRACTITIONER

## 2023-11-28 PROCEDURE — 3080F DIAST BP >= 90 MM HG: CPT | Mod: CPTII,S$GLB,, | Performed by: NURSE PRACTITIONER

## 2023-11-28 PROCEDURE — 1159F PR MEDICATION LIST DOCUMENTED IN MEDICAL RECORD: ICD-10-PCS | Mod: CPTII,S$GLB,, | Performed by: NURSE PRACTITIONER

## 2023-11-28 PROCEDURE — 74018 RADEX ABDOMEN 1 VIEW: CPT | Mod: TC

## 2023-11-28 PROCEDURE — 1160F PR REVIEW ALL MEDS BY PRESCRIBER/CLIN PHARMACIST DOCUMENTED: ICD-10-PCS | Mod: CPTII,S$GLB,, | Performed by: NURSE PRACTITIONER

## 2023-11-28 PROCEDURE — 3008F BODY MASS INDEX DOCD: CPT | Mod: CPTII,S$GLB,, | Performed by: NURSE PRACTITIONER

## 2023-11-28 PROCEDURE — 99999 PR PBB SHADOW E&M-EST. PATIENT-LVL IV: CPT | Mod: PBBFAC,,, | Performed by: NURSE PRACTITIONER

## 2023-11-28 PROCEDURE — 74018 XR ABDOMEN AP 1 VIEW: ICD-10-PCS | Mod: 26,,, | Performed by: RADIOLOGY

## 2023-11-28 PROCEDURE — 99999 PR PBB SHADOW E&M-EST. PATIENT-LVL IV: ICD-10-PCS | Mod: PBBFAC,,, | Performed by: NURSE PRACTITIONER

## 2023-11-28 PROCEDURE — 3077F PR MOST RECENT SYSTOLIC BLOOD PRESSURE >= 140 MM HG: ICD-10-PCS | Mod: CPTII,S$GLB,, | Performed by: NURSE PRACTITIONER

## 2023-11-28 PROCEDURE — 76981 FIBROSCAN NEW ORLEANS (VIBRATION CONTROLLED TRANSIENT ELASTOGRAPHY): ICD-10-PCS | Mod: S$GLB,,, | Performed by: NURSE PRACTITIONER

## 2023-11-28 PROCEDURE — 3044F HG A1C LEVEL LT 7.0%: CPT | Mod: CPTII,S$GLB,, | Performed by: NURSE PRACTITIONER

## 2023-11-28 PROCEDURE — 1159F MED LIST DOCD IN RCRD: CPT | Mod: CPTII,S$GLB,, | Performed by: NURSE PRACTITIONER

## 2023-11-28 PROCEDURE — 3080F PR MOST RECENT DIASTOLIC BLOOD PRESSURE >= 90 MM HG: ICD-10-PCS | Mod: CPTII,S$GLB,, | Performed by: NURSE PRACTITIONER

## 2023-11-28 PROCEDURE — 76981 USE PARENCHYMA: CPT | Mod: S$GLB,,, | Performed by: NURSE PRACTITIONER

## 2023-11-28 PROCEDURE — 99204 OFFICE O/P NEW MOD 45 MIN: CPT | Mod: S$GLB,,, | Performed by: NURSE PRACTITIONER

## 2023-11-28 PROCEDURE — 3008F PR BODY MASS INDEX (BMI) DOCUMENTED: ICD-10-PCS | Mod: CPTII,S$GLB,, | Performed by: NURSE PRACTITIONER

## 2023-11-28 PROCEDURE — 74018 RADEX ABDOMEN 1 VIEW: CPT | Mod: 26,,, | Performed by: RADIOLOGY

## 2023-11-28 PROCEDURE — 99204 PR OFFICE/OUTPT VISIT, NEW, LEVL IV, 45-59 MIN: ICD-10-PCS | Mod: S$GLB,,, | Performed by: NURSE PRACTITIONER

## 2023-11-28 PROCEDURE — 3044F PR MOST RECENT HEMOGLOBIN A1C LEVEL <7.0%: ICD-10-PCS | Mod: CPTII,S$GLB,, | Performed by: NURSE PRACTITIONER

## 2023-11-29 ENCOUNTER — PATIENT MESSAGE (OUTPATIENT)
Dept: HEPATOLOGY | Facility: CLINIC | Age: 58
End: 2023-11-29
Payer: COMMERCIAL

## 2023-11-29 NOTE — PROCEDURES
FibroScan [place] (Vibration Controlled Transient Elastography)    Date/Time: 11/28/2023 4:15 PM    Performed by: Larisa Buitrago NP  Authorized by: Larisa Buitrago NP    Diagnosis:  NAFLD    Probe:  M    Universal Protocol: Patient's identity, procedure and site were verified, confirmatory pause was performed.  Discussed procedure including risks and potential complications.  Questions answered.  Patient verbalizes understanding and wishes to proceed with VCTE.     Procedure: After providing explanations of the procedure, patient was placed in the supine position with right arm in maximum abduction to allow optimal exposure of right lateral abdomen.  Patient was briefly assessed, Testing was performed in the mid-axillary location, 50Hz Shear Wave pulses were applied and the resulting Shear Wave and Propagation Speed detected with a 3.5 MHz ultrasonic signal, using the FibroScan probe, Skin to liver capsule distance and liver parenchyma were accessed during the entire examination with the FibroScan probe, Patient was instructed to breathe normally and to abstain from sudden movements during the procedure, allowing for random measurements of liver stiffness. At least 10 Shear Waves were produced, Individual measurements of each Shear Wave were calculated.  Patient tolerated the procedure well with no complications.  Meets discharge criteria as was dismissed.  Rates pain 0 out of 10.  Patient will follow up with ordering provider to review results.    Findings  Median liver stiffness score:  5.9  CAP Reading: dB/m:  333    IQR/med %:  14  Interpretation  Fibrosis interpretation is based on medial liver stiffness - Kilopascal (kPa).    Fibrosis Stage:  F 0-1  Steatosis interpretation is based on controlled attenuation parameter - (dB/m).    Steatosis Grade:  S3

## 2023-12-04 ENCOUNTER — TELEPHONE (OUTPATIENT)
Dept: GENETICS | Facility: CLINIC | Age: 58
End: 2023-12-04
Payer: COMMERCIAL

## 2023-12-04 PROBLEM — K76.0 NAFLD (NONALCOHOLIC FATTY LIVER DISEASE): Status: ACTIVE | Noted: 2023-12-04

## 2023-12-04 PROBLEM — R74.8 ELEVATED LIVER ENZYMES: Status: ACTIVE | Noted: 2023-12-04

## 2023-12-04 NOTE — TELEPHONE ENCOUNTER
Spoke with pt  to schedule appt with GC. Pt  scheduled in person appt for 12/18 at 1pm. Pt understood and confirmed appt.       ----- Message from Amna Aleman CGC sent at 12/4/2023  3:03 PM CST -----  Regarding: RE: Please advise  Contact: pt/[phone]  Can schedule with me.   ----- Message -----  From: Rachel Us MA  Sent: 12/4/2023   2:55 PM CST  To: Amna Aleman CGC  Subject: Please advise                                      ----- Message -----  From: Suma Mason  Sent: 12/4/2023   2:51 PM CST  To: [facility] Clinical Support Staff    Type:  Sooner Appointment Request    Caller is requesting a sooner appointment.  Caller declined first available appointment listed below.  Caller will not accept being placed on the waitlist and is requesting a message be sent to doctor.  Name of Caller: Pt   When is the first available appointment? 02/19/2024  Symptoms: Pt  states  he  need to  be  tested for  Dementia    Would the patient rather a call back or a response via [facility]? Call   Best Call Back Number:[phone]  Additional Information:  Please call  pt  regarding scheduling

## 2023-12-05 ENCOUNTER — OFFICE VISIT (OUTPATIENT)
Dept: SURGERY | Facility: CLINIC | Age: 58
End: 2023-12-05
Payer: COMMERCIAL

## 2023-12-05 ENCOUNTER — TELEPHONE (OUTPATIENT)
Dept: UROLOGY | Facility: CLINIC | Age: 58
End: 2023-12-05
Payer: COMMERCIAL

## 2023-12-05 ENCOUNTER — TELEPHONE (OUTPATIENT)
Dept: UROLOGY | Facility: CLINIC | Age: 58
End: 2023-12-05

## 2023-12-05 ENCOUNTER — OFFICE VISIT (OUTPATIENT)
Dept: UROLOGY | Facility: CLINIC | Age: 58
End: 2023-12-05
Payer: COMMERCIAL

## 2023-12-05 VITALS
HEART RATE: 63 BPM | SYSTOLIC BLOOD PRESSURE: 143 MMHG | OXYGEN SATURATION: 97 % | HEIGHT: 68 IN | BODY MASS INDEX: 33.06 KG/M2 | WEIGHT: 218.13 LBS | DIASTOLIC BLOOD PRESSURE: 81 MMHG

## 2023-12-05 VITALS
HEIGHT: 68 IN | BODY MASS INDEX: 33.05 KG/M2 | DIASTOLIC BLOOD PRESSURE: 83 MMHG | WEIGHT: 218.06 LBS | HEART RATE: 56 BPM | SYSTOLIC BLOOD PRESSURE: 143 MMHG

## 2023-12-05 DIAGNOSIS — Z12.5 PROSTATE CANCER SCREENING: Primary | ICD-10-CM

## 2023-12-05 DIAGNOSIS — N20.0 BILATERAL KIDNEY STONES: ICD-10-CM

## 2023-12-05 DIAGNOSIS — N20.0 KIDNEY STONES: ICD-10-CM

## 2023-12-05 DIAGNOSIS — N20.0 KIDNEY STONES: Primary | ICD-10-CM

## 2023-12-05 DIAGNOSIS — K80.20 CALCULUS OF GALLBLADDER WITHOUT CHOLECYSTITIS WITHOUT OBSTRUCTION: Primary | ICD-10-CM

## 2023-12-05 PROCEDURE — 3077F SYST BP >= 140 MM HG: CPT | Mod: CPTII,S$GLB,, | Performed by: UROLOGY

## 2023-12-05 PROCEDURE — 1159F PR MEDICATION LIST DOCUMENTED IN MEDICAL RECORD: ICD-10-PCS | Mod: CPTII,S$GLB,, | Performed by: UROLOGY

## 2023-12-05 PROCEDURE — 3008F BODY MASS INDEX DOCD: CPT | Mod: CPTII,S$GLB,, | Performed by: SURGERY

## 2023-12-05 PROCEDURE — 99999 PR PBB SHADOW E&M-EST. PATIENT-LVL III: ICD-10-PCS | Mod: PBBFAC,,, | Performed by: SURGERY

## 2023-12-05 PROCEDURE — 3044F HG A1C LEVEL LT 7.0%: CPT | Mod: CPTII,S$GLB,, | Performed by: SURGERY

## 2023-12-05 PROCEDURE — 3044F PR MOST RECENT HEMOGLOBIN A1C LEVEL <7.0%: ICD-10-PCS | Mod: CPTII,S$GLB,, | Performed by: SURGERY

## 2023-12-05 PROCEDURE — 3044F HG A1C LEVEL LT 7.0%: CPT | Mod: CPTII,S$GLB,, | Performed by: UROLOGY

## 2023-12-05 PROCEDURE — 1159F MED LIST DOCD IN RCRD: CPT | Mod: CPTII,S$GLB,, | Performed by: SURGERY

## 2023-12-05 PROCEDURE — 1160F PR REVIEW ALL MEDS BY PRESCRIBER/CLIN PHARMACIST DOCUMENTED: ICD-10-PCS | Mod: CPTII,S$GLB,, | Performed by: UROLOGY

## 2023-12-05 PROCEDURE — 99999 PR PBB SHADOW E&M-EST. PATIENT-LVL IV: CPT | Mod: PBBFAC,,, | Performed by: UROLOGY

## 2023-12-05 PROCEDURE — 99999 PR PBB SHADOW E&M-EST. PATIENT-LVL IV: ICD-10-PCS | Mod: PBBFAC,,, | Performed by: UROLOGY

## 2023-12-05 PROCEDURE — 1159F MED LIST DOCD IN RCRD: CPT | Mod: CPTII,S$GLB,, | Performed by: UROLOGY

## 2023-12-05 PROCEDURE — 3044F PR MOST RECENT HEMOGLOBIN A1C LEVEL <7.0%: ICD-10-PCS | Mod: CPTII,S$GLB,, | Performed by: UROLOGY

## 2023-12-05 PROCEDURE — 3077F PR MOST RECENT SYSTOLIC BLOOD PRESSURE >= 140 MM HG: ICD-10-PCS | Mod: CPTII,S$GLB,, | Performed by: UROLOGY

## 2023-12-05 PROCEDURE — 1160F RVW MEDS BY RX/DR IN RCRD: CPT | Mod: CPTII,S$GLB,, | Performed by: UROLOGY

## 2023-12-05 PROCEDURE — 3008F PR BODY MASS INDEX (BMI) DOCUMENTED: ICD-10-PCS | Mod: CPTII,S$GLB,, | Performed by: UROLOGY

## 2023-12-05 PROCEDURE — 3079F DIAST BP 80-89 MM HG: CPT | Mod: CPTII,S$GLB,, | Performed by: UROLOGY

## 2023-12-05 PROCEDURE — 3077F PR MOST RECENT SYSTOLIC BLOOD PRESSURE >= 140 MM HG: ICD-10-PCS | Mod: CPTII,S$GLB,, | Performed by: SURGERY

## 2023-12-05 PROCEDURE — 99999 PR PBB SHADOW E&M-EST. PATIENT-LVL III: CPT | Mod: PBBFAC,,, | Performed by: SURGERY

## 2023-12-05 PROCEDURE — 87086 URINE CULTURE/COLONY COUNT: CPT | Performed by: UROLOGY

## 2023-12-05 PROCEDURE — 3079F PR MOST RECENT DIASTOLIC BLOOD PRESSURE 80-89 MM HG: ICD-10-PCS | Mod: CPTII,S$GLB,, | Performed by: UROLOGY

## 2023-12-05 PROCEDURE — 3079F DIAST BP 80-89 MM HG: CPT | Mod: CPTII,S$GLB,, | Performed by: SURGERY

## 2023-12-05 PROCEDURE — 99214 PR OFFICE/OUTPT VISIT, EST, LEVL IV, 30-39 MIN: ICD-10-PCS | Mod: S$GLB,,, | Performed by: UROLOGY

## 2023-12-05 PROCEDURE — 3079F PR MOST RECENT DIASTOLIC BLOOD PRESSURE 80-89 MM HG: ICD-10-PCS | Mod: CPTII,S$GLB,, | Performed by: SURGERY

## 2023-12-05 PROCEDURE — 99203 OFFICE O/P NEW LOW 30 MIN: CPT | Mod: S$GLB,,, | Performed by: SURGERY

## 2023-12-05 PROCEDURE — 1159F PR MEDICATION LIST DOCUMENTED IN MEDICAL RECORD: ICD-10-PCS | Mod: CPTII,S$GLB,, | Performed by: SURGERY

## 2023-12-05 PROCEDURE — 3008F PR BODY MASS INDEX (BMI) DOCUMENTED: ICD-10-PCS | Mod: CPTII,S$GLB,, | Performed by: SURGERY

## 2023-12-05 PROCEDURE — 3008F BODY MASS INDEX DOCD: CPT | Mod: CPTII,S$GLB,, | Performed by: UROLOGY

## 2023-12-05 PROCEDURE — 99214 OFFICE O/P EST MOD 30 MIN: CPT | Mod: S$GLB,,, | Performed by: UROLOGY

## 2023-12-05 PROCEDURE — 3077F SYST BP >= 140 MM HG: CPT | Mod: CPTII,S$GLB,, | Performed by: SURGERY

## 2023-12-05 PROCEDURE — 99203 PR OFFICE/OUTPT VISIT, NEW, LEVL III, 30-44 MIN: ICD-10-PCS | Mod: S$GLB,,, | Performed by: SURGERY

## 2023-12-05 RX ORDER — DOXYCYCLINE HYCLATE 20 MG
20 TABLET ORAL 2 TIMES DAILY
COMMUNITY
Start: 2023-12-02

## 2023-12-05 RX ORDER — URSODIOL 300 MG/1
300 CAPSULE ORAL 2 TIMES DAILY
Qty: 60 CAPSULE | Refills: 11 | Status: SHIPPED | OUTPATIENT
Start: 2023-12-05 | End: 2024-12-04

## 2023-12-05 NOTE — TELEPHONE ENCOUNTER
Called and spoke to the pt to offer surgery date of 12/18/23, pt accepted. Informed the pt I will call the day before surgery with arrival time and pre-op instructions after 2pm. Pt verbalized understanding.

## 2023-12-05 NOTE — H&P (VIEW-ONLY)
"Urology - [facility]  Clinic Note    SUBJECTIVE:     Chief Complaint: kidney stones    History of Present Illness:  John Salcido is a 58 y.o. male who presents to clinic for kidney stones. He is established to our clinic to our clinic. Referral from Dedra Ragsdale NP     Bilateral renal stones - patient would like treated before the end of the year at the same time.   No metabolic workup in the past.     No obstructive symptoms.   Does have post void dribble.   Some urgency with caffeine.         Anticoagulation:  No    OBJECTIVE:     Estimated body mass index is 33.15 kg/m² as calculated from the following:    Height as of this encounter: 5' 8" (1.727 m).    Weight as of this encounter: 98.9 kg (218 lb 0.6 oz).    Vital Signs (Most Recent)  Pulse: (!) 56 (12/05/23 1021)  BP: (!) 143/83 (12/05/23 1021)    Physical Exam  Vitals reviewed.   Pulmonary:      Effort: Pulmonary effort is normal.         Lab Results   Component Value Date    BUN 12 10/24/2023    CREATININE 0.8 11/28/2023    WBC 5.37 11/16/2023    HGB 16.3 11/16/2023    HCT 46.2 11/16/2023     11/16/2023    AST 52 (H) 11/16/2023    ALT 89 (H) 11/16/2023    ALKPHOS 84 11/16/2023    ALBUMIN 4.4 11/16/2023    HGBA1C 5.4 11/16/2023            Imaging:  Decreased hepatic attenuation consistent with steatosis.  No focal liver lesion.  Questionable small punctate gallstone.  Unremarkable spleen, pancreas, and adrenal glands.  Bilateral nephrolithiasis measuring up to 8 mm upper pole right and 7 mm midpole left kidney.  No hydronephrosis.  No gross gastric abnormality.  No bowel obstruction.  Normal appendix.  No free fluid.       Urine dip clear    ASSESSMENT     1. Kidney stones    2. Bilateral kidney stones      PLAN:   John was seen today for new patient.    Diagnoses and all orders for this visit:    Prostate cancer screening  -     PSA, Screening; Future    Kidney stones  -     Ambulatory referral/consult to Urology  -     Urine " culture  -     X-Ray Chest PA And Lateral; Future    Bilateral kidney stones  -     Ambulatory referral/consult to Urology  -     SCHEDULED EKG 12-LEAD (to Muse); Future      The patient is scheduled for bilateral ureteroscopy. The risks and benefits of ureteroscopy were discussed with the patient in detail.  Consent was obtained.  The risks include but are not limited to burning with urination, bleeding, infection, pain, incomplete fragmentation of the stone, need for further procedures, injury to the kidney, ureter, bladder and need for open surgery.  The patient was informed that they may require a ureteral stent and that stents can cause irritative voiding symptoms.  They also understand that ureteral stents are temporary and must be removed or exchanged in a timely fashion as they can calcify and make more stones and become difficult to remove. Alternative treatments were also discussed with the patient in detail to include ESWL, percutaneous treatment of the stone, open surgery or observation. Patient understands these risks and has agreed to proceed with surgery. Patient would like both sides treated.   BRAYAN at time of cysto.   psa      Loli Castillo MD     Letter to Dedra Ragsdale NP

## 2023-12-05 NOTE — PROGRESS NOTES
[facility]  General Surgery  History & Physical      Subjective:     Chief Complaint: symptomatic cholelithiasis     History of Present Illness:  Patient is a 58 y.o. male with Hx of HTN and fatty liver with chronic steatosis and chronically elevated LFTs and t bili who presents for evaluation of cholelithiasis. Patient states that he intermittently has pain after a fatty meal that lasts for a couple of hours and self resolves. Underwent RUQ US which showed a 1 cm, mobile gallstone. He states that his symptoms do not significantly impact his life and he is interested in observation as opposed to surgery.     Past Medical History:   Diagnosis Date    High cholesterol     Hypertension     Kidney stones       Past Surgical History:   Procedure Laterality Date    CYSTOSCOPY W/ RETROGRADES Bilateral 9/13/2018    Procedure: CYSTOSCOPY WITH RETROGRADE PYELOGRAM;  Surgeon: Francisco Pena MD;  Location: [facility] OR;  Service: Urology;  Laterality: Bilateral;    LASER LITHOTRIPSY Left 9/13/2018    Procedure: LITHOTRIPSY-LASER;  Surgeon: Francisco Pena MD;  Location: [facility] OR;  Service: Urology;  Laterality: Left;    URETERAL STENT PLACEMENT Left 9/13/2018    Procedure: INSERTION, STENT, URETER;  Surgeon: Francisco Pena MD;  Location: [facility] OR;  Service: Urology;  Laterality: Left;    URETEROSCOPIC REMOVAL OF URETERIC CALCULUS Left 9/13/2018    Procedure: EXTRACTION-STONE-URETEROSCOPY WITH BASKET;  Surgeon: Francisco Pena MD;  Location: [facility] OR;  Service: Urology;  Laterality: Left;    URETEROSCOPY Left 9/13/2018    Procedure: URETEROSCOPY;  Surgeon: Francisco Pena MD;  Location: [facility] OR;  Service: Urology;  Laterality: Left;      Family History   Problem Relation Age of Onset    Cirrhosis Father       Social History     Socioeconomic History    Marital status:    Tobacco Use    Smoking status: Never    Smokeless tobacco: Never   Substance and Sexual Activity    Alcohol use: Yes    Drug use:  Never     Social Determinants of Health     Financial Resource Strain: High Risk (11/25/2023)    Overall Financial Resource Strain (CARDIA)     Difficulty of Paying Living Expenses: Hard   Food Insecurity: No Food Insecurity (11/25/2023)    Hunger Vital Sign     Worried About Running Out of Food in the Last Year: Never true     Ran Out of Food in the Last Year: Never true   Transportation Needs: No Transportation Needs (11/25/2023)    PRAPARE - Transportation     Lack of Transportation (Medical): No     Lack of Transportation (Non-Medical): No   Physical Activity: Sufficiently Active (11/25/2023)    Exercise Vital Sign     Days of Exercise per Week: 5 days     Minutes of Exercise per Session: 90 min   Stress: Stress Concern Present (11/25/2023)    [place] [employer] - Occupational Stress Questionnaire     Feeling of Stress : To some extent   Social Connections: Unknown (11/25/2023)    Social Connection and Isolation Panel [NHANES]     Frequency of Communication with Friends and Family: Once a week     Frequency of Social Gatherings with Friends and Family: Once a week     Active Member of Clubs or Organizations: No     Attends Club or Organization Meetings: Never     Marital Status:    Housing Stability: Low Risk  (11/25/2023)    Housing Stability Vital Sign     Unable to Pay for Housing in the Last Year: No     Number of Places Lived in the Last Year: 1     Unstable Housing in the Last Year: No        Review of systems: see HPI    Current Outpatient Medications on File Prior to Visit   Medication Sig    allopurinol (ZYLOPRIM) 300 MG tablet Take 300 mg by mouth once daily.    dexlansoprazole (DEXILANT) 60 mg capsule Take 1 capsule by mouth every morning.    metoprolol succinate (TOPROL-XL) 50 MG 24 hr tablet Take 50 mg by mouth 2 (two) times daily.    simvastatin (ZOCOR) 20 MG tablet Take 20 mg by mouth once daily.     No current facility-administered medications on file prior to visit.        Review of patient's allergies indicates:  No Known Allergies    Review of Systems   Constitutional:  Negative for chills and fever.   Respiratory:  Negative for cough and shortness of breath.    Cardiovascular:  Negative for chest pain and palpitations.   Gastrointestinal:  Positive for abdominal pain (occasional). Negative for nausea and vomiting.   Genitourinary:  Negative for dysuria and hematuria.   Musculoskeletal:  Negative for back pain and gait problem.   Skin:  Negative for color change, rash and wound.   Neurological:  Negative for weakness and headaches.   Hematological:  Negative for adenopathy. Does not bruise/bleed easily.   Psychiatric/Behavioral:  Negative for agitation and confusion.           Objective:     Vital Signs (Most Recent):           There is no height or weight on file to calculate BMI.    Physical Exam  Vitals reviewed.   Constitutional:       General: He is not in acute distress.     Appearance: He is normal weight. He is not toxic-appearing.   HENT:      Head: Normocephalic and atraumatic.      Nose: Nose normal.   Cardiovascular:      Rate and Rhythm: Normal rate.      Pulses: Normal pulses.   Pulmonary:      Effort: Pulmonary effort is normal. No respiratory distress.   Abdominal:      General: Abdomen is flat. There is no distension.      Palpations: Abdomen is soft.      Tenderness: There is no abdominal tenderness.   Skin:     General: Skin is warm.   Neurological:      General: No focal deficit present.      Mental Status: He is alert and oriented to person, place, and time.   Psychiatric:         Mood and Affect: Mood normal.         Behavior: Behavior normal.          Labs:  CMP  Sodium   Date Value Ref Range Status   10/24/2023 142 136 - 145 mmol/L Final     Potassium   Date Value Ref Range Status   10/24/2023 4.4 3.5 - 5.1 mmol/L Final     Chloride   Date Value Ref Range Status   10/24/2023 105 95 - 110 mmol/L Final     CO2   Date Value Ref Range Status   10/24/2023 26  23 - 29 mmol/L Final     Glucose   Date Value Ref Range Status   10/24/2023 120 (H) 74 - 106 mg/dL Final     BUN   Date Value Ref Range Status   10/24/2023 12 9 - 20 mg/dL Final     Creatinine   Date Value Ref Range Status   11/28/2023 0.8 0.5 - 1.4 mg/dL Final     Calcium   Date Value Ref Range Status   11/28/2023 9.6 8.7 - 10.5 mg/dL Final     Total Protein   Date Value Ref Range Status   11/16/2023 7.4 6.0 - 8.4 g/dL Final     Albumin   Date Value Ref Range Status   11/16/2023 4.4 3.5 - 5.2 g/dL Final     Total Bilirubin   Date Value Ref Range Status   11/16/2023 1.7 (H) 0.1 - 1.0 mg/dL Final     Comment:     For infants and newborns, interpretation of results should be based  on gestational age, weight and in agreement with clinical  observations.    Premature Infant recommended reference ranges:  Up to 24 hours.............<8.0 mg/dL  Up to 48 hours............<12.0 mg/dL  3-5 days..................<15.0 mg/dL  6-29 days.................<15.0 mg/dL       Alkaline Phosphatase   Date Value Ref Range Status   11/16/2023 84 55 - 135 U/L Final     AST   Date Value Ref Range Status   11/16/2023 52 (H) 10 - 40 U/L Final     ALT   Date Value Ref Range Status   11/16/2023 89 (H) 10 - 44 U/L Final     Anion Gap   Date Value Ref Range Status   10/24/2023 11 8 - 16 mmol/L Final     eGFR   Date Value Ref Range Status   11/28/2023 >60.0 >60 mL/min/1.73 m^2 Final         Imaging:    US Abdomen Limited_Gallbladder  12/4/2023    1. Cholelithiasis without evidence of cholecystitis. 2. Evidence of hepatocellular disease, corresponding with steatosis on prior abdomen pelvis CT.          Pathology:  none    Assessment/Plan:   58 y.o. male with symptomatic cholelithiasis. He states that his symptoms are not severe and that he would like to forgo surgical intervention. I think this is reasonable.     - RTC prn   - requested danielle Oliveira MD  General Surgery PGY-III  [facility]

## 2023-12-06 ENCOUNTER — HOSPITAL ENCOUNTER (OUTPATIENT)
Dept: PULMONOLOGY | Facility: HOSPITAL | Age: 58
Discharge: HOME | End: 2023-12-06
Attending: UROLOGY
Payer: COMMERCIAL

## 2023-12-06 ENCOUNTER — TELEPHONE (OUTPATIENT)
Dept: GENETICS | Facility: CLINIC | Age: 58
End: 2023-12-06
Payer: COMMERCIAL

## 2023-12-06 ENCOUNTER — HOSPITAL ENCOUNTER (OUTPATIENT)
Dept: RADIOLOGY | Facility: HOSPITAL | Age: 58
Discharge: HOME | End: 2023-12-06
Attending: UROLOGY
Payer: COMMERCIAL

## 2023-12-06 DIAGNOSIS — N20.0 KIDNEY STONES: ICD-10-CM

## 2023-12-06 DIAGNOSIS — N20.0 BILATERAL KIDNEY STONES: ICD-10-CM

## 2023-12-06 LAB — BACTERIA UR CULT: NO GROWTH

## 2023-12-06 PROCEDURE — 71046 X-RAY EXAM CHEST 2 VIEWS: CPT | Mod: 26,,, | Performed by: RADIOLOGY

## 2023-12-06 PROCEDURE — 71046 XR CHEST PA AND LATERAL: ICD-10-PCS | Mod: 26,,, | Performed by: RADIOLOGY

## 2023-12-06 PROCEDURE — 93010 ELECTROCARDIOGRAM REPORT: CPT | Mod: ,,, | Performed by: INTERNAL MEDICINE

## 2023-12-06 PROCEDURE — 93005 ELECTROCARDIOGRAM TRACING: CPT

## 2023-12-06 PROCEDURE — 71046 X-RAY EXAM CHEST 2 VIEWS: CPT | Mod: TC

## 2023-12-06 PROCEDURE — 93010 EKG 12-LEAD: ICD-10-PCS | Mod: ,,, | Performed by: INTERNAL MEDICINE

## 2023-12-06 NOTE — TELEPHONE ENCOUNTER
----- Message from Danna Briggs RN sent at 12/5/2023  5:04 PM CST -----  Contact: [phone]    ----- Message -----  From: Shila Salas  Sent: 12/5/2023   3:36 PM CST  To: [facility] Staff    the patient is calling to get rescheduled for there appt.  the patient would like a call back at your earliest convenience.  the patient can be reached at.  [phone]

## 2023-12-06 NOTE — TELEPHONE ENCOUNTER
Called and spoke with patient.  He has an appt with SONJA Aleman on 12/18, but he now has a scheduled surgery for that day and will need to reschedule his Genetics appt.  He would like to reschedule before the New Year if possible because he has met his deductible, and if the appointment will have to be in the New Year he'd like to have any testing ordered prior to then if possible. Informed GC sees patients on Mondays at at this time the schedule is full for 12/11, closed 12/25 for holiday.  Discussed possible next available on 1/8/24 at 1pm, and informed I will call back to confirm if anything can be scheduled sooner.

## 2023-12-07 ENCOUNTER — TELEPHONE (OUTPATIENT)
Dept: PREADMISSION TESTING | Facility: HOSPITAL | Age: 58
End: 2023-12-07
Payer: COMMERCIAL

## 2023-12-07 ENCOUNTER — TELEPHONE (OUTPATIENT)
Dept: UROLOGY | Facility: CLINIC | Age: 58
End: 2023-12-07
Payer: COMMERCIAL

## 2023-12-07 NOTE — ANESTHESIA PAT ROS NOTE
12/07/2023  John Salcido is a 58 y.o., male.      Pre-op Assessment          Review of Systems  Anesthesia Hx:   History of prior surgery of interest to airway management or planning:  Previous anesthesia: General 9/13/2018 CYsto with Retrograde Pyelogram with general anesthesia.  Procedure performed at an [facility].       Denies Family Hx of Anesthesia complications.   Personal Hx of Anesthesia complications, Post-Operative Nausea/Vomiting, with every anesthetic, treatment not known                    Social:  Social Alcohol Use       Hematology/Oncology:  Hematology Normal   Oncology Normal                                   EENT/Dental:   Dental implant non removalable          Cardiovascular:            Denies Angina.     hyperlipidemia     Functional Capacity good / => 4 METS                   Hypertension , Fairly Controlled on RX        Pulmonary:       Denies Shortness of breath.   Recent CXR showed Bronchitis, sent to PCP for clearance               Renal/:   renal calculi               Hepatic/GI:    Esophageal / Stomach Disorders  Controlled by chronic antireflux medication.     Biliary Disease, Cholelithiosis  Liver Disease, Fatty Liver        Musculoskeletal:  Arthritis               Neurological:    Denies CVA.    Denies Seizures.                                Endocrine:        Obesity / BMI > 30  Dermatological:  Skin Normal    Psych:  Psychiatric Normal                         Anesthesia Assessment: Preoperative EQUATION    Planned Procedure: Procedure(s) (LRB):  CYSTOURETEROSCOPY, WITH HOLMIUM LASER LITHOTRIPSY OF URETERAL CALCULUS AND STENT INSERTION (Bilateral)  CYSTOSCOPY, WITH RETROGRADE PYELOGRAM (N/A)  Requested Anesthesia Type:General  Surgeon: Loli Castillo MD  Service: Urology  Known or anticipated Date of Surgery:12/18/2023    Surgeon notes: reviewed    Electronic  QUestionnaire Assessment completed via nurse interview with patient.        Triage considerations:     The patient has no apparent active cardiac condition (No unstable coronary Syndrome such as severe unstable angina or recent [<1 month] myocardial infarction, decompensated CHF, severe valvular   disease or significant arrhythmia)    Previous anesthesia records:GETA and Hx PONV  9/13/2018 Cysto with retrograde pyelogram  Airway/Jaw/Neck:  Airway Findings: Mouth Opening: Normal Tongue: Normal  General Airway Assessment: Adult  Mallampati: II  TM Distance: Normal, at least 6 cm    09/13/18 Placement Time: 1236 Method of Intubation: Direct laryngoscopy Inserted by: CRNA Airway Device: Endotracheal Tube Mask Ventilation: Easy Intubated: Postinduction Blade: Blackwell #2 Airway Device Size: 7.0 Style: Cuffed Cuff Inflation: Minimal occlusive pressure Placement Verified By: Auscultation;Capnometry;ETT Condensation Grade: Grade I Complicating Factors: None Findings Post-Intubation: Positive EtCO2;Bilateral breath sounds;Atraumatic/Condition of teeth unchanged Depth of Insertion (cm): 22 Secured at: Lips Complications: None Breath Sounds: Equal Bilateral Insertion attempts (enter comment if more than 2 attempts): 1     Last PCP note: outside [facility]   Subspecialty notes: Hepatology, Urology    Other important co-morbidities: HTN, Obesity, and NAFLD       Tests already available:  Available tests,  within 3 months , within [facility] .  12/6/2023 CXR, EKG  11/28/2023 PTH  11/16/2023 A1c (5.4), CBC, Hepatic Function Panel  10/24/2023 BMP             Instructions given. (See in Nurse's note)    Optimization:  Anesthesia Preop Clinic Assessment  Indicated: Not required for this procedure.    Medical Opinion Indicated. (Per Surgeon request secondary to CXR results)       Plan:    Testing:  PT/INR and PTT     Consultation:Patient's PCP for a statement of optimization      Patient  has previously scheduled Medical Appointment: Not at  this time prior to surgery    Navigation: Tests Scheduled.              Consults scheduled.             Results will be tracked by Preop Clinic.    12/12/2023 Lab results noted. Clearance and OS visit note received via fax, scanned to media

## 2023-12-07 NOTE — TELEPHONE ENCOUNTER
----- Message from Danna Guerrero RN sent at 12/7/2023  9:17 AM CST -----  Surgery date: 12/18/2023  PreOp appt:  N/A  Surgeon:  Jonathan    Please call Pt and schedule the following preop appts:    Lab    Thank you!  Danna

## 2023-12-07 NOTE — TELEPHONE ENCOUNTER
Called and spoke to the pt to reschedule surgery procedure scheduled for 12/18 due to the surgeon being out of the office. I offered the pt 12/13, pt accepted. Informed the pt I will call the day before with arrival time and pre-op instructions. Pt verbalized understanding.

## 2023-12-07 NOTE — PRE-PROCEDURE INSTRUCTIONS
PreOp and Medication Instructions given and reviewed:   - Verbal medication instructions (instructed to hold vitamins, herbal supplements and NSAIDS one week prior to surgery)  - NPO guidelines   - Arrival place and time to be given by the Surgeon's Office   - Shower with antibacterial soap   - No makeup or moisturizer to face   - No perfume/cologne, powder, lotions, deodorant or aftershave   - Leave all jewelry, including body piercings, and valuables at home.  - Arrange for someone to drive you home following surgery; will not be allowed to leave the surgical facility alone or drive self home following sedation and anesthesia.    Pt verbalized understanding.  Pt instructed to call POC with any questions or changes.

## 2023-12-11 ENCOUNTER — OFFICE VISIT (OUTPATIENT)
Dept: GENETICS | Facility: CLINIC | Age: 58
End: 2023-12-11
Payer: COMMERCIAL

## 2023-12-11 ENCOUNTER — LAB VISIT (OUTPATIENT)
Dept: LAB | Facility: HOSPITAL | Age: 58
End: 2023-12-11
Attending: ANESTHESIOLOGY
Payer: COMMERCIAL

## 2023-12-11 DIAGNOSIS — Z01.818 PREOPERATIVE TESTING: ICD-10-CM

## 2023-12-11 DIAGNOSIS — R41.3 MEMORY LOSS: Primary | ICD-10-CM

## 2023-12-11 DIAGNOSIS — Z81.8 FAMILY HISTORY OF DEMENTIA: ICD-10-CM

## 2023-12-11 LAB
APTT PPP: 25.4 SEC (ref 21–32)
INR PPP: 1 (ref 0.8–1.2)
PROTHROMBIN TIME: 11.1 SEC (ref 9–12.5)

## 2023-12-11 PROCEDURE — 36415 COLL VENOUS BLD VENIPUNCTURE: CPT | Performed by: ANESTHESIOLOGY

## 2023-12-11 PROCEDURE — 85730 THROMBOPLASTIN TIME PARTIAL: CPT | Performed by: ANESTHESIOLOGY

## 2023-12-11 PROCEDURE — 85610 PROTHROMBIN TIME: CPT | Performed by: ANESTHESIOLOGY

## 2023-12-11 PROCEDURE — 99999 PR PBB SHADOW E&M-EST. PATIENT-LVL II: ICD-10-PCS | Mod: PBBFAC,,, | Performed by: GENETIC COUNSELOR, MS

## 2023-12-11 PROCEDURE — 99999 PR PBB SHADOW E&M-EST. PATIENT-LVL II: CPT | Mod: PBBFAC,,, | Performed by: GENETIC COUNSELOR, MS

## 2023-12-12 ENCOUNTER — TELEPHONE (OUTPATIENT)
Dept: UROLOGY | Facility: CLINIC | Age: 58
End: 2023-12-12
Payer: COMMERCIAL

## 2023-12-12 NOTE — TELEPHONE ENCOUNTER
Called pt to confirm arrival time of 11:45am for procedure on 12/13/23. Gave pt NPO instructions and gave pt opportunity to ask questions. Pt verbalized understanding.

## 2023-12-13 ENCOUNTER — HOSPITAL ENCOUNTER (OUTPATIENT)
Facility: HOSPITAL | Age: 58
Discharge: HOME | End: 2023-12-13
Attending: UROLOGY | Admitting: UROLOGY
Payer: COMMERCIAL

## 2023-12-13 ENCOUNTER — ANESTHESIA EVENT (OUTPATIENT)
Dept: SURGERY | Facility: HOSPITAL | Age: 58
End: 2023-12-13
Payer: COMMERCIAL

## 2023-12-13 ENCOUNTER — ANESTHESIA (OUTPATIENT)
Dept: SURGERY | Facility: HOSPITAL | Age: 58
End: 2023-12-13
Payer: COMMERCIAL

## 2023-12-13 VITALS
HEART RATE: 58 BPM | TEMPERATURE: 98 F | HEIGHT: 68 IN | DIASTOLIC BLOOD PRESSURE: 87 MMHG | OXYGEN SATURATION: 98 % | BODY MASS INDEX: 32.28 KG/M2 | WEIGHT: 213 LBS | RESPIRATION RATE: 12 BRPM | SYSTOLIC BLOOD PRESSURE: 166 MMHG

## 2023-12-13 DIAGNOSIS — Z01.818 PREOPERATIVE TESTING: ICD-10-CM

## 2023-12-13 DIAGNOSIS — N20.0 NEPHROLITHIASIS: Primary | ICD-10-CM

## 2023-12-13 PROCEDURE — D9220A PRA ANESTHESIA: ICD-10-PCS | Mod: CRNA,,, | Performed by: NURSE ANESTHETIST, CERTIFIED REGISTERED

## 2023-12-13 PROCEDURE — 52356 CYSTO/URETERO W/LITHOTRIPSY: CPT | Mod: 50,,, | Performed by: UROLOGY

## 2023-12-13 PROCEDURE — 27201423 OPTIME MED/SURG SUP & DEVICES STERILE SUPPLY: Performed by: UROLOGY

## 2023-12-13 PROCEDURE — D9220A PRA ANESTHESIA: Mod: CRNA,,, | Performed by: NURSE ANESTHETIST, CERTIFIED REGISTERED

## 2023-12-13 PROCEDURE — 36000706: Performed by: UROLOGY

## 2023-12-13 PROCEDURE — 71000044 HC DOSC ROUTINE RECOVERY FIRST HOUR: Performed by: UROLOGY

## 2023-12-13 PROCEDURE — C1769 GUIDE WIRE: HCPCS | Performed by: UROLOGY

## 2023-12-13 PROCEDURE — D9220A PRA ANESTHESIA: Mod: ANES,,, | Performed by: ANESTHESIOLOGY

## 2023-12-13 PROCEDURE — 37000008 HC ANESTHESIA 1ST 15 MINUTES: Performed by: UROLOGY

## 2023-12-13 PROCEDURE — 52356 PR CYSTO/URETERO W/LITHOTRIPSY: ICD-10-PCS | Mod: 50,,, | Performed by: UROLOGY

## 2023-12-13 PROCEDURE — 25000003 PHARM REV CODE 250: Performed by: NURSE ANESTHETIST, CERTIFIED REGISTERED

## 2023-12-13 PROCEDURE — 63600175 PHARM REV CODE 636 W HCPCS: Performed by: NURSE ANESTHETIST, CERTIFIED REGISTERED

## 2023-12-13 PROCEDURE — 36000707: Performed by: UROLOGY

## 2023-12-13 PROCEDURE — C2617 STENT, NON-COR, TEM W/O DEL: HCPCS | Performed by: UROLOGY

## 2023-12-13 PROCEDURE — 37000009 HC ANESTHESIA EA ADD 15 MINS: Performed by: UROLOGY

## 2023-12-13 PROCEDURE — 63600175 PHARM REV CODE 636 W HCPCS

## 2023-12-13 PROCEDURE — 71000016 HC POSTOP RECOV ADDL HR: Performed by: UROLOGY

## 2023-12-13 PROCEDURE — 25000003 PHARM REV CODE 250: Performed by: UROLOGY

## 2023-12-13 PROCEDURE — 71000015 HC POSTOP RECOV 1ST HR: Performed by: UROLOGY

## 2023-12-13 PROCEDURE — 63600175 PHARM REV CODE 636 W HCPCS: Performed by: ANESTHESIOLOGY

## 2023-12-13 PROCEDURE — 25000003 PHARM REV CODE 250

## 2023-12-13 PROCEDURE — C1894 INTRO/SHEATH, NON-LASER: HCPCS | Performed by: UROLOGY

## 2023-12-13 PROCEDURE — D9220A PRA ANESTHESIA: ICD-10-PCS | Mod: ANES,,, | Performed by: ANESTHESIOLOGY

## 2023-12-13 PROCEDURE — 82365 CALCULUS SPECTROSCOPY: CPT | Performed by: UROLOGY

## 2023-12-13 DEVICE — STENT URETERAL UNIV 6FR 26CM: Type: IMPLANTABLE DEVICE | Site: URETER | Status: FUNCTIONAL

## 2023-12-13 RX ORDER — PHENAZOPYRIDINE HYDROCHLORIDE 100 MG/1
100 TABLET, FILM COATED ORAL 3 TIMES DAILY PRN
Qty: 21 TABLET | Refills: 0 | Status: SHIPPED | OUTPATIENT
Start: 2023-12-13 | End: 2023-12-20

## 2023-12-13 RX ORDER — TAMSULOSIN HYDROCHLORIDE 0.4 MG/1
0.4 CAPSULE ORAL NIGHTLY
Qty: 30 CAPSULE | Refills: 0 | Status: SHIPPED | OUTPATIENT
Start: 2023-12-13 | End: 2024-01-12

## 2023-12-13 RX ORDER — PHENAZOPYRIDINE HYDROCHLORIDE 100 MG/1
100 TABLET, FILM COATED ORAL ONCE
Status: COMPLETED | OUTPATIENT
Start: 2023-12-13 | End: 2023-12-13

## 2023-12-13 RX ORDER — OXYBUTYNIN CHLORIDE 5 MG/1
5 TABLET ORAL 3 TIMES DAILY PRN
Qty: 30 TABLET | Refills: 0 | Status: SHIPPED | OUTPATIENT
Start: 2023-12-13 | End: 2024-01-09

## 2023-12-13 RX ORDER — ONDANSETRON 2 MG/ML
INJECTION INTRAMUSCULAR; INTRAVENOUS
Status: DISCONTINUED | OUTPATIENT
Start: 2023-12-13 | End: 2023-12-13

## 2023-12-13 RX ORDER — PROPOFOL 10 MG/ML
VIAL (ML) INTRAVENOUS
Status: DISCONTINUED | OUTPATIENT
Start: 2023-12-13 | End: 2023-12-13

## 2023-12-13 RX ORDER — FENTANYL CITRATE 50 UG/ML
INJECTION, SOLUTION INTRAMUSCULAR; INTRAVENOUS
Status: DISCONTINUED | OUTPATIENT
Start: 2023-12-13 | End: 2023-12-13

## 2023-12-13 RX ORDER — SODIUM CHLORIDE 0.9 % (FLUSH) 0.9 %
3 SYRINGE (ML) INJECTION
Status: DISCONTINUED | OUTPATIENT
Start: 2023-12-13 | End: 2023-12-13 | Stop reason: HOSPADM

## 2023-12-13 RX ORDER — SULFAMETHOXAZOLE AND TRIMETHOPRIM 800; 160 MG/1; MG/1
1 TABLET ORAL 2 TIMES DAILY
Qty: 2 TABLET | Refills: 0 | Status: SHIPPED | OUTPATIENT
Start: 2023-12-15 | End: 2023-12-16

## 2023-12-13 RX ORDER — LIDOCAINE HYDROCHLORIDE 20 MG/ML
JELLY TOPICAL
Status: DISCONTINUED | OUTPATIENT
Start: 2023-12-13 | End: 2023-12-13 | Stop reason: HOSPADM

## 2023-12-13 RX ORDER — KETOROLAC TROMETHAMINE 10 MG/1
10 TABLET, FILM COATED ORAL EVERY 6 HOURS PRN
Qty: 12 TABLET | Refills: 0 | Status: SHIPPED | OUTPATIENT
Start: 2023-12-13 | End: 2024-01-09

## 2023-12-13 RX ORDER — FAMOTIDINE 10 MG/ML
INJECTION INTRAVENOUS
Status: DISCONTINUED | OUTPATIENT
Start: 2023-12-13 | End: 2023-12-13

## 2023-12-13 RX ORDER — ROCURONIUM BROMIDE 10 MG/ML
INJECTION, SOLUTION INTRAVENOUS
Status: DISCONTINUED | OUTPATIENT
Start: 2023-12-13 | End: 2023-12-13

## 2023-12-13 RX ORDER — LIDOCAINE HYDROCHLORIDE 20 MG/ML
INJECTION, SOLUTION EPIDURAL; INFILTRATION; INTRACAUDAL; PERINEURAL
Status: DISCONTINUED | OUTPATIENT
Start: 2023-12-13 | End: 2023-12-13

## 2023-12-13 RX ORDER — SCOLOPAMINE TRANSDERMAL SYSTEM 1 MG/1
PATCH, EXTENDED RELEASE TRANSDERMAL
Status: DISCONTINUED
Start: 2023-12-13 | End: 2023-12-13 | Stop reason: HOSPADM

## 2023-12-13 RX ORDER — SODIUM CHLORIDE 9 MG/ML
INJECTION, SOLUTION INTRAVENOUS CONTINUOUS
Status: DISCONTINUED | OUTPATIENT
Start: 2023-12-13 | End: 2023-12-13 | Stop reason: HOSPADM

## 2023-12-13 RX ORDER — FENTANYL CITRATE 50 UG/ML
25 INJECTION, SOLUTION INTRAMUSCULAR; INTRAVENOUS EVERY 5 MIN PRN
Status: DISCONTINUED | OUTPATIENT
Start: 2023-12-13 | End: 2023-12-13 | Stop reason: HOSPADM

## 2023-12-13 RX ORDER — OXYBUTYNIN CHLORIDE 5 MG/1
5 TABLET ORAL ONCE
Status: COMPLETED | OUTPATIENT
Start: 2023-12-13 | End: 2023-12-13

## 2023-12-13 RX ORDER — MIDAZOLAM HYDROCHLORIDE 1 MG/ML
INJECTION, SOLUTION INTRAMUSCULAR; INTRAVENOUS
Status: DISCONTINUED | OUTPATIENT
Start: 2023-12-13 | End: 2023-12-13

## 2023-12-13 RX ORDER — ONDANSETRON 2 MG/ML
4 INJECTION INTRAMUSCULAR; INTRAVENOUS DAILY PRN
Status: DISCONTINUED | OUTPATIENT
Start: 2023-12-13 | End: 2023-12-13 | Stop reason: HOSPADM

## 2023-12-13 RX ADMIN — SUGAMMADEX 200 MG: 100 INJECTION, SOLUTION INTRAVENOUS at 02:12

## 2023-12-13 RX ADMIN — ONDANSETRON 4 MG: 2 INJECTION INTRAMUSCULAR; INTRAVENOUS at 01:12

## 2023-12-13 RX ADMIN — PROPOFOL 150 MG: 10 INJECTION, EMULSION INTRAVENOUS at 01:12

## 2023-12-13 RX ADMIN — OXYBUTYNIN CHLORIDE 5 MG: 5 TABLET ORAL at 03:12

## 2023-12-13 RX ADMIN — ROCURONIUM BROMIDE 10 MG: 10 INJECTION INTRAVENOUS at 02:12

## 2023-12-13 RX ADMIN — SODIUM CHLORIDE: 0.9 INJECTION, SOLUTION INTRAVENOUS at 01:12

## 2023-12-13 RX ADMIN — ONDANSETRON 4 MG: 2 INJECTION INTRAMUSCULAR; INTRAVENOUS at 04:12

## 2023-12-13 RX ADMIN — FAMOTIDINE 20 MG: 10 INJECTION, SOLUTION INTRAVENOUS at 01:12

## 2023-12-13 RX ADMIN — FENTANYL CITRATE 100 MCG: 50 INJECTION INTRAMUSCULAR; INTRAVENOUS at 01:12

## 2023-12-13 RX ADMIN — CEFAZOLIN 2 G: 2 INJECTION, POWDER, FOR SOLUTION INTRAMUSCULAR; INTRAVENOUS at 01:12

## 2023-12-13 RX ADMIN — ROCURONIUM BROMIDE 50 MG: 10 INJECTION INTRAVENOUS at 01:12

## 2023-12-13 RX ADMIN — MIDAZOLAM 2 MG: 1 INJECTION INTRAMUSCULAR; INTRAVENOUS at 01:12

## 2023-12-13 RX ADMIN — LIDOCAINE HYDROCHLORIDE 100 MG: 20 INJECTION, SOLUTION EPIDURAL; INFILTRATION; INTRACAUDAL at 01:12

## 2023-12-13 RX ADMIN — PHENAZOPYRIDINE HYDROCHLORIDE 100 MG: 100 TABLET ORAL at 03:12

## 2023-12-13 NOTE — PLAN OF CARE
Pt tolerated procedure well.  Discharge paperwork printed and reviewed with pt and family-copies of paperwork sent home with pt.  No complaints of pain or nausea.  Pt tolerating PO liquids well.  VSS.  IV removed.  IV removed.  Pt urinated 200 cc prior to discharge.  Safety maintained throughout care.

## 2023-12-13 NOTE — DISCHARGE SUMMARY
[facility] (1st Fl)  Discharge Note  Short Stay    Procedure(s) (LRB):  CYSTOSCOPY (N/A)  URETEROSCOPY (Bilateral)  PYELOSCOPY (Bilateral)  PLACEMENT-STENT (Bilateral)  EXTRACTION - STONE (Bilateral)  LITHOTRIPSY, USING LASER (Bilateral)  PYELOGRAM, RETROGRADE (Bilateral)      OUTCOME: Patient tolerated treatment/procedure well without complication and is now ready for discharge.    DISPOSITION: Home or Self Care    FINAL DIAGNOSIS:  Calculus of kidney    FOLLOWUP: In clinic    DISCHARGE INSTRUCTIONS:    Discharge Procedure Orders   US Kidney   Standing Status: Future Standing Exp. Date: 12/13/24     Order Specific Question Answer Comments   Reason for Exam: hx of renal stones    May the Radiologist modify the order per protocol to meet the clinical needs of the patient? Yes    Release to patient Immediate      APTT   Standing Status: Future Number of Occurrences: 1 Standing Exp. Date: 02/04/25     Protime-INR   Standing Status: Future Number of Occurrences: 1 Standing Exp. Date: 02/04/25     No dressing needed     Notify your health care provider if you experience any of the following:  temperature >100.4     Notify your health care provider if you experience any of the following:  persistent nausea and vomiting or diarrhea     Notify your health care provider if you experience any of the following:  severe uncontrolled pain     Notify your health care provider if you experience any of the following:  difficulty breathing or increased cough     Notify your health care provider if you experience any of the following:  severe persistent headache     Notify your health care provider if you experience any of the following:  worsening rash     Notify your health care provider if you experience any of the following:  persistent dizziness, light-headedness, or visual disturbances     Activity as tolerated        TIME SPENT ON DISCHARGE: 15 minutes

## 2023-12-13 NOTE — TRANSFER OF CARE
"Anesthesia Transfer of Care Note    Patient: John Salcido    Procedure(s) Performed: Procedure(s) (LRB):  CYSTOSCOPY (N/A)  URETEROSCOPY (Bilateral)  PYELOSCOPY (Bilateral)  PLACEMENT-STENT (Bilateral)  EXTRACTION - STONE (Bilateral)  LITHOTRIPSY, USING LASER (Bilateral)    Patient location: PACU    Anesthesia Type: general    Transport from OR: Transported from OR on 6-10 L/min O2 by face mask with adequate spontaneous ventilation    Post pain: adequate analgesia    Post assessment: no apparent anesthetic complications and tolerated procedure well    Post vital signs: stable    Level of consciousness: awake and alert    Nausea/Vomiting: no nausea/vomiting    Complications: none    Transfer of care protocol was followed    Last vitals: Visit Vitals  BP (!) 165/87 (BP Location: Left arm, Patient Position: Lying)   Pulse (!) 58   Temp 36.8 °C (98.2 °F) (Temporal)   Resp 18   Ht 5' 8" (1.727 m)   Wt 96.6 kg (213 lb)   SpO2 97%   BMI 32.39 kg/m²     "

## 2023-12-13 NOTE — ANESTHESIA PROCEDURE NOTES
Intubation    Date/Time: 12/13/2023 1:29 PM    Performed by: Julieta Ortiz CRNA  Authorized by: Tammy Chatterjee MD    Intubation:     Induction:  Intravenous    Intubated:  Postinduction    Mask Ventilation:  Easy mask    Attempts:  1    Attempted By:  CRNA    Method of Intubation:  Video laryngoscopy    Blade:  Guy 3    Laryngeal View Grade: Grade I - full view of cords      Difficult Airway Encountered?: No      Complications:  None    Airway Device:  Oral endotracheal tube    Airway Device Size:  7.5    Style/Cuff Inflation:  Cuffed (inflated to minimal occlusive pressure)    Inflation Amount (mL):  5    Tube secured:  21    Secured at:  The lips    Placement Verified By:  Capnometry    Complicating Factors:  None    Findings Post-Intubation:  BS equal bilateral and atraumatic/condition of teeth unchanged

## 2023-12-13 NOTE — INTERVAL H&P NOTE
The patient has been examined and the H&P has been reviewed:    I concur with the findings and no changes have occurred since H&P was written.    Procedure risks, benefits and alternative options discussed and understood by patient/family.    All benefits, risks, and alternatives were explained to the patient in great detail. All patient questions were addressed and the patient voiced clear understanding of our discussion. The patient has elected to undergo b/l URS.    The patient denies all recent n/v/d, fevers, chills, and illnesses.     Urine dipstick - specific gravity 1.015, pH 6. Negative for all remaining components.     There are no hospital problems to display for this patient.

## 2023-12-13 NOTE — DISCHARGE INSTRUCTIONS
Please pull your stent with strings at 7 am the morning of Friday (12/15/23) . Standing in the shower, remove the tape, pull the strings with a steady, gentle force in one motion until the entire stent is removed.     Post Cystoscopy Instructions  Do not strain to have a bowel movement  No strenuous exercise x 7 days  No driving while you are on narcotic pain medications or if your muir  catheter is in place    You can expect:  To pass stone fragments if you had a stone procedure  Have pain when you void from your stent if you have a stent in place  See blood in your urine if you have a stent in place    If you have a catheter, please return to the ER if your catheter stops draining or you are having abdominal pain.    Call the doctor if:  Temperature is greater than 101F  Persistent vomiting and inability to keep food down  Inability to void if you do not have a catheter

## 2023-12-13 NOTE — OP NOTE
[facility]  Operative Note    Date: 12/13/2023    Pre-Op Diagnosis: bilateral renal stones    Patient Active Problem List    Diagnosis Date Noted    Elevated liver enzymes 12/04/2023    NAFLD (nonalcoholic fatty liver disease) 12/04/2023    DDD (degenerative disc disease), lumbar 10/02/2023    Lumbar pain 10/02/2023    Cervical pain (neck) 10/02/2023    Neck stiffness 10/02/2023    Decreased functional mobility and endurance 10/02/2023    Diastasis recti 10/02/2023    Calculus of kidney 09/13/2018    Renal colic 09/13/2018    Calculus of ureter 09/13/2018    Gross hematuria 09/13/2018       Post-Op Diagnosis: same    Procedure(s) Performed:   1. Bilateral ureteroscopy with laser lithotripsy and stone basketing  2. Cystoscopy  3. Bilateral ureteral stent placement  4. Bilateral pyeloscopy  5. Fluoro < 1 hr  6. Rectal exam under anesthesia    Specimen(s): stone for analysis    Staff Surgeon: Loli Castillo MD    Assistant Surgeon: Herber Rocha MD    Anesthesia: General endotracheal anesthesia    Indications: John Salcido is a 58 y.o. male with a bilateral renal stones presenting for definitive stone management. He is not pre-stented.    Findings:  1. Stones are not visible on  film.  2. Bilateral renal stones encountered, dusted/fragmented with all pieces >1mm removed  3. Bilateral ureteral stents placed  4. BRAYAN: 40cc prostate, smooth, non-nodular    Estimated Blood Loss: min    Drains:   1. Bilateral 6 Fr x 26 cm JJ ureteral stent with strings    Procedure in detail:  After risks, benefits, and possible complications were explained, the patient elected to undergo the procedure and informed consent was obtained. All questions were answered in the elysia-operative area. The patient was transferred to the cystoscopy suite and placed in the supine position. SCDs were applied and working. Anesthesia was administered. The patient was then placed in the dorsal lithotomy position and prepped and  draped in the usual sterile fashion. Time out was performed, and elysia-procedural antibiotics were confirmed.     The stones were not visible on  film. A rigid cystoscope in a 22 Fr sheath was introduced into the patient's urethra. This passed easily. The entire urethra was visualized which showed no strictures or masses. Cystoscopy revealed the ureteral orifices in the normal anatomic location bilaterally.    A motion wire was passed up the right ureteral orifice and up into the kidney. This passed easily and placement was confirmed fluoroscopically. The cystoscope was removed keeping the wire in place.    An 8 Fr rigid ureteroscope was passed into the patient's bladder alongside the wire under direct vision. It was then passed through the right ureteral orifice alongside the wire. The entire length of the ureter was surveyed and no stone fragments were visualized.    A stiff glide wire was inserted through the ureteroscope and into the kidney with fluoroscopic confirmation. The ureteroscope was removed keeping both wires in place. A 12/14 Fr 45 cm ureteral access sheath was advanced over the stiff glide wire to the level of the renal pelvis under continuous fluoroscopy. This passed easily. The inner dilator and stiff glide wire were removed keeping the outer sheath in place. An 8 Fr flexible ureteroscope was advanced through the access sheath and into the kidney.    Stones were encountered in upper and lower poles. The laser fiber was inserted per the scope and the stones were fragmented/dusted. An NCircle basket was inserted per the scope and fragments were removed and passed off the field for analysis. Systematic pyeloscopy was performed and all remaining stone fragments were deemed small enough to pass spontaneously, <1 mm. The scope and ureteral access sheath were removed keeping the motion wire in place.    The cystoscope was reinserted and the bladder was irrigated to remove the stone fragments. The  bladder was drained and the cystoscope removed keeping the wire in place.    We then turned our attention to the left UO. A motion wire was passed up the left ureteral orifice and up into the kidney. This passed easily and placement was confirmed fluoroscopically. The cystoscope was removed keeping the wire in place.    An 8 Fr rigid ureteroscope was passed into the patient's bladder alongside the wire under direct vision. It was then passed through the left ureteral orifice alongside the wire. The entire length of the ureter was surveyed and no stone fragments were visualized.    A stiff glide wire was inserted through the ureteroscope and into the kidney with fluoroscopic confirmation. The ureteroscope was removed keeping both wires in place. A 12/14 Fr 45 cm ureteral access sheath was advanced over the stiff glide wire to the level of the renal pelvis under continuous fluoroscopy. This passed easily. The inner dilator and stiff glide wire were removed keeping the outer sheath in place. An 8 Fr flexible ureteroscope was advanced through the access sheath and into the kidney.    Stones were encountered in upper and lower poles. The laser fiber was inserted per the scope and the stones were fragmented/dusted. An NCircle basket was inserted per the scope and fragments were removed and passed off the field for analysis. Systematic pyeloscopy was performed and all remaining stone fragments were deemed small enough to pass spontaneously, <1 mm. The scope and ureteral access sheath were removed keeping the motion wire in place.    The cystoscope was reinserted and the bladder was irrigated to remove the stone fragments. The bladder was drained and the cystoscope removed keeping the wire in place.     A 6 Fr x 26 cm JJ ureteral stent with strings was passed over the wire and up into the right renal pelvis using fluoro. When the coil appeared to be in good position in the kidney and the radio-opaque marker of the pusher  was at the inferior pubis, the wire was removed under continuous fluoro. Good coils were seen in the kidney and the bladder using fluoro. This was then repeated in the contralateral ureter. We then did a digital rectal exam with the above findings.     The patient tolerated the procedure well and was transferred to the recovery room in stable condition.    Disposition:  The patient will be discharged home from PACU. He follow up with Dr. Castillo  in 3 months with a renal ultrasound prior. He was given written instructions to self-remove his ureteral stent with strings on Friday, 12/15/2023.    Herber Rocha MD

## 2023-12-13 NOTE — ANESTHESIA PREPROCEDURE EVALUATION
12/13/2023  John Salcido is a 58 y.o., male.Pre-operative evaluation for Procedure(s) (LRB):  CYSTOURETEROSCOPY, WITH HOLMIUM LASER LITHOTRIPSY OF URETERAL CALCULUS AND STENT INSERTION (Bilateral)  CYSTOSCOPY, WITH RETROGRADE PYELOGRAM (N/A)    John Salcido is a 58 y.o. male     Patient Active Problem List   Diagnosis    Calculus of kidney    Renal colic    Calculus of ureter    Gross hematuria    DDD (degenerative disc disease), lumbar    Lumbar pain    Cervical pain (neck)    Neck stiffness    Decreased functional mobility and endurance    Diastasis recti    Elevated liver enzymes    NAFLD (nonalcoholic fatty liver disease)       Review of patient's allergies indicates:  No Known Allergies    No current facility-administered medications on file prior to encounter.     Current Outpatient Medications on File Prior to Encounter   Medication Sig Dispense Refill    allopurinol (ZYLOPRIM) 300 MG tablet Take 300 mg by mouth once daily.      dexlansoprazole (DEXILANT) 60 mg capsule Take 1 capsule by mouth every morning.      doxycycline (PERIOSTAT) 20 MG tablet Take 20 mg by mouth 2 (two) times daily.      metoprolol succinate (TOPROL-XL) 50 MG 24 hr tablet Take 50 mg by mouth 2 (two) times daily.      simvastatin (ZOCOR) 20 MG tablet Take 20 mg by mouth once daily.      ursodioL (ACTIGALL) 300 mg capsule Take 1 capsule (300 mg total) by mouth 2 (two) times daily. 60 capsule 11       Past Surgical History:   Procedure Laterality Date    CYSTOSCOPY W/ RETROGRADES Bilateral 9/13/2018    Procedure: CYSTOSCOPY WITH RETROGRADE PYELOGRAM;  Surgeon: Francisco Pena MD;  Location: [facility] OR;  Service: Urology;  Laterality: Bilateral;    LASER LITHOTRIPSY Left 9/13/2018    Procedure: LITHOTRIPSY-LASER;  Surgeon: Francisco Pena MD;  Location: [facility] OR;  Service: Urology;  Laterality: Left;    URETERAL STENT PLACEMENT Left  9/13/2018    Procedure: INSERTION, STENT, URETER;  Surgeon: Francisco Pena MD;  Location: [facility] OR;  Service: Urology;  Laterality: Left;    URETEROSCOPIC REMOVAL OF URETERIC CALCULUS Left 9/13/2018    Procedure: EXTRACTION-STONE-URETEROSCOPY WITH BASKET;  Surgeon: Francisco Pena MD;  Location: [facility] OR;  Service: Urology;  Laterality: Left;    URETEROSCOPY Left 9/13/2018    Procedure: URETEROSCOPY;  Surgeon: Francisco Pena MD;  Location: [facility] OR;  Service: Urology;  Laterality: Left;       Social History     Socioeconomic History    Marital status:    Tobacco Use    Smoking status: Former     Types: Cigarettes     Passive exposure: Past    Smokeless tobacco: Never   Substance and Sexual Activity    Alcohol use: Yes    Drug use: Never     Social Determinants of Health     Financial Resource Strain: High Risk (11/25/2023)    Overall Financial Resource Strain (CARDIA)     Difficulty of Paying Living Expenses: Hard   Food Insecurity: No Food Insecurity (11/25/2023)    Hunger Vital Sign     Worried About Running Out of Food in the Last Year: Never true     Ran Out of Food in the Last Year: Never true   Transportation Needs: No Transportation Needs (11/25/2023)    PRAPARE - Transportation     Lack of Transportation (Medical): No     Lack of Transportation (Non-Medical): No   Physical Activity: Sufficiently Active (11/25/2023)    Exercise Vital Sign     Days of Exercise per Week: 5 days     Minutes of Exercise per Session: 90 min   Stress: Stress Concern Present (11/25/2023)    [place] citizen [place] [employer] - Occupational Stress Questionnaire     Feeling of Stress : To some extent   Social Connections: Unknown (11/25/2023)    Social Connection and Isolation Panel [NHANES]     Frequency of Communication with Friends and Family: Once a week     Frequency of Social Gatherings with Friends and Family: Once a week     Active Member of Clubs or Organizations: No     Attends Club or Organization Meetings:  "Never     Marital Status:    Housing Stability: Low Risk  (2023)    Housing Stability Vital Sign     Unable to Pay for Housing in the Last Year: No     Number of Places Lived in the Last Year: 1     Unstable Housing in the Last Year: No         CBC: No results for input(s): "WBC", "RBC", "HGB", "HCT", "PLT", "MCV", "MCH", "MCHC" in the last 72 hours.    CMP: No results for input(s): "NA", "K", "CL", "CO2", "BUN", "CREATININE", "GLU", "MG", "PHOS", "CALCIUM", "ALBUMIN", "PROT", "ALKPHOS", "ALT", "AST", "BILITOT" in the last 72 hours.    INR  Recent Labs     23  1448   INR 1.0   APTT 25.4           Diagnostic Studies:      EKD Echo:  No results found for this or any previous visit.        Pre-op Assessment    I have reviewed the Patient Summary Reports.    I have reviewed the NPO Status.      Review of Systems  Anesthesia Hx:  No problems with previous Anesthesia   History of prior surgery of interest to airway management or planning:          Denies Family Hx of Anesthesia complications.    Denies Personal Hx of Anesthesia complications.                    Cardiovascular:     Hypertension                                        Renal/:   Denies Chronic Renal Disease. renal calculi               Hepatic/GI:      Liver Disease,                Physical Exam  General: Well nourished, Cooperative, Alert and Oriented    Airway:  Mallampati: III   Mouth Opening: Normal  TM Distance: Normal  Tongue: Normal  Neck ROM: Normal ROM        Anesthesia Plan  Type of Anesthesia, risks & benefits discussed:    Anesthesia Type: Gen ETT, Gen Supraglottic Airway  Intra-op Monitoring Plan: Standard ASA Monitors  Post Op Pain Control Plan: multimodal analgesia  Induction:  IV  Airway Plan: Direct  Informed Consent: Informed consent signed with the Patient and all parties understand the risks and agree with anesthesia plan.  All questions answered.   ASA Score: 2    Ready For Surgery From Anesthesia " Perspective.     .

## 2023-12-13 NOTE — PROGRESS NOTES
John Salcido   : 1965  MRN: [id number]    REFERRING MD: Self, Aaareferral    REASON FOR CONSULT: Our [facility] was asked to provide genetic counseling for this 58-year-old male with a family history of dementia. He presents with his wife for today's visit.     HISTORY OF PRESENT ILLNESS: Mr. Salcido is a 58-year-old male with no known diagnosis of dementia or memory loss, however he feels that his memory and overall cognition has been declining over the last several years. He has trouble remembering articles he's read or conversations he has had. He had not had neuropsychiatric testing. His sister is 64yo and having memory problems. His mother had dementia in her 60s and his maternal grandmother and great-aunt also had dementia.     MEDICAL HISTORY:  Active Problem List with Overview Notes    Diagnosis Date Noted    Elevated liver enzymes 2023    NAFLD (nonalcoholic fatty liver disease) 2023    DDD (degenerative disc disease), lumbar 10/02/2023    Lumbar pain 10/02/2023    Cervical pain (neck) 10/02/2023    Neck stiffness 10/02/2023    Decreased functional mobility and endurance 10/02/2023    Diastasis recti 10/02/2023    Calculus of kidney 2018    Renal colic 2018    Calculus of ureter 2018    Gross hematuria 2018     FAMILY HISTORY:  Mr. Salcido has three daughters ages 39, 37, and 29 who are healthy. He has 8 grandchildren. He has 5 siblings. One of his sisters  of pancreatic cancer at age 58. Another sister is starting to have memory issues at 64yo but no diagnosis of dementia. His mother  at age 74 from COPD. She had early signs of dementia starting at age 64. His maternal grandmother  at age 86 from Alzheimer disease (AD). She started to have symptoms in her 70s. His great-aunt also  of AD in her 80s. She started to have symptoms in her 70s. His father  at age 73 from liver cancer. Several other paternal relatives had liver issues.          IMPRESSION: Mr. Salcido is a 58-year-old male with a family history of dementia and concern for memory loss.     We reviewed the family history of Alzheimer disease. We discussed that the majority of Alzheimer disease is sporadic, particularly when it is late onset (above the age of 60). The background population risk for AD is approximately 1 in 9. 15-25% of cases are thought to be familial. Inheritance is generally considered to be multifactorial, however, or a mixture of genetic and environmental factors.      There are also certain autosomal dominant genes that cause early-onset Alzheimer disease, but this does not easily fit Mr. Salcido's family history, however incomplete penetrance has been noted and age of onset can be variable.  Genetic testing for APOE susceptibility is also possible, but risk association varies widely and is often presented as an odds ratio and other genetic factors are largely unknown at this time. Genetic testing for APOE is further complicated because the ?4 allele is neither necessary nor sufficient to cause AD. There is low sensitivity and specificity of testing, lack of preventative options, and it is difficult to ascertain probabilistic risk. Having one APOE ?4 allele increases risk to develop AD 2-fold to about 23% for men and 35% for women, similar to the risk associated with a first-degree relative with AD. However, there are current prevention clinical trials for individuals with APOE ?4 alleles, and more prevention treatments may be available in the future.     Because Mr. Salcido is concerned about his memory loss, I recommended starting with a neuropsychiatric evaluation. If dementia is indeed suspected, genetic testing would certainly be indicated given his age and family history. In the absence of a clinical diagnosis, a negative result may be uninformative and does not negate the risk of AD due to multifactorial inheritance or genetic factors that are largely unknown.      We discussed the family history of cancer in his sister. He meets NCCN guidelines for genetic testing given his sister's diagnosis with pancreatic cancer. He declined testing at this time, but may think about it for the future.     RECOMMENDATIONS:  Referral to neurology/neuropsychology   Option to pursue cancer genetic counseling/testing (declined today)   Follow-up as needed     TIME SPENT: 45 minutes     Amna Aleman, MPH, MS, [facility]  Genetic Counselor   [facility]    Cristy Lock M.D.                                                                                   Medical Geneticist                                                                                                               [facility]

## 2023-12-13 NOTE — ANESTHESIA POSTPROCEDURE EVALUATION
Anesthesia Post Evaluation    Patient: John Salcido    Procedure(s) Performed: Procedure(s) (LRB):  CYSTOSCOPY (N/A)  URETEROSCOPY (Bilateral)  PYELOSCOPY (Bilateral)  PLACEMENT-STENT (Bilateral)  EXTRACTION - STONE (Bilateral)  LITHOTRIPSY, USING LASER (Bilateral)    Final Anesthesia Type: general      Patient location during evaluation: PACU  Patient participation: Yes- Able to Participate  Level of consciousness: awake and alert  Post-procedure vital signs: reviewed and stable  Pain management: adequate  Airway patency: patent    PONV status at discharge: No PONV  Anesthetic complications: no      Cardiovascular status: blood pressure returned to baseline and hemodynamically stable  Respiratory status: unassisted and spontaneous ventilation  Hydration status: euvolemic  Follow-up not needed.              Vitals Value Taken Time   /95 12/13/23 1532   Temp  12/13/23 1535   Pulse 52 12/13/23 1534   Resp 12 12/13/23 1534   SpO2 96 % 12/13/23 1534   Vitals shown include unvalidated device data.      No case tracking events are documented in the log.      Pain/James Score: James Score: 9 (12/13/2023  3:30 PM)

## 2023-12-15 ENCOUNTER — TELEPHONE (OUTPATIENT)
Dept: UROLOGY | Facility: CLINIC | Age: 58
End: 2023-12-15
Payer: COMMERCIAL

## 2023-12-15 NOTE — TELEPHONE ENCOUNTER
Spoke to the patient. All his questions were answered. Patient stated he was advised to pull the stent on Monday.    TITO Otero    ----- Message from [employer] PIERRE Route sent at 12/15/2023 11:24 AM CST -----  Regarding: Questions  Contact: pt.  [phone]  Pt is calling in ref to 12/13 past procedure. He says he is still seeing blood and has questions about the stint. Asking to speak with someone. Patient Requesting Call Back @  [phone]

## 2023-12-18 LAB
COMPN STONE: NORMAL
LABORATORY COMMENT REPORT: NORMAL
SPECIMEN SOURCE: NORMAL
STONE ANALYSIS IR-IMP: NORMAL

## 2023-12-22 ENCOUNTER — OFFICE VISIT (OUTPATIENT)
Dept: ORTHOPEDICS | Facility: CLINIC | Age: 58
End: 2023-12-22
Payer: COMMERCIAL

## 2023-12-22 DIAGNOSIS — M54.12 CERVICAL RADICULOPATHY: Primary | ICD-10-CM

## 2023-12-22 DIAGNOSIS — M51.36 DDD (DEGENERATIVE DISC DISEASE), LUMBAR: ICD-10-CM

## 2023-12-22 PROCEDURE — 99212 PR OFFICE/OUTPT VISIT, EST, LEVL II, 10-19 MIN: ICD-10-PCS | Mod: 93,,, | Performed by: ORTHOPAEDIC SURGERY

## 2023-12-22 PROCEDURE — 99212 OFFICE O/P EST SF 10 MIN: CPT | Mod: 93,,, | Performed by: ORTHOPAEDIC SURGERY

## 2023-12-22 PROCEDURE — 3044F HG A1C LEVEL LT 7.0%: CPT | Mod: CPTII,93,, | Performed by: ORTHOPAEDIC SURGERY

## 2023-12-22 PROCEDURE — 3044F PR MOST RECENT HEMOGLOBIN A1C LEVEL <7.0%: ICD-10-PCS | Mod: CPTII,93,, | Performed by: ORTHOPAEDIC SURGERY

## 2023-12-22 NOTE — PROGRESS NOTES
Established Patient - Audio Only Telehealth Visit     The patient location is: home  The chief complaint leading to consultation is: follow up  Visit type: Virtual visit with audio only (telephone)  Total time spent with patient: 10 min       The reason for the audio only service rather than synchronous audio and video virtual visit was related to technical difficulties or patient preference/necessity.     Each patient to whom I provide medical services by telemedicine is:  (1) informed of the relationship between the physician and patient and the respective role of any other health care provider with respect to management of the patient; and (2) notified that they may decline to receive medical services by telemedicine and may withdraw from such care at any time. Patient verbally consented to receive this service via voice-only telephone call.       DATE: 12/22/2023  PATIENT: John Salcido    Attending Physician: Josh Peterson MD    HISTORY:  John Salcido is a 58 y.o. male who returns to me today for follow up.  He was last seen by me 8/17/2023.  Today he is doing well but notes he has been doing PT with mild relief but continues to have neck, arm, low back, and leg pain.     The Patient denies myelopathic symptoms such as handwriting changes or difficulty with buttons/coins/keys. Denies perineal paresthesias, bowel/bladder dysfunction.    PMH/PSH/FamHx/SocHx:  Unchanged from prior visit    ROS:  REVIEW OF SYSTEMS:  Constitution: Negative. Negative for chills, fever and night sweats.   HENT: Negative for congestion and headaches.    Eyes: Negative for blurred vision, left vision loss and right vision loss.   Cardiovascular: Negative for chest pain and syncope.   Respiratory: Negative for cough and shortness of breath.    Endocrine: Negative for polydipsia, polyphagia and polyuria.   Hematologic/Lymphatic: Negative for bleeding problem. Does not bruise/bleed easily.   Skin: Negative for dry skin, itching and rash.    Musculoskeletal: Negative for falls and muscle weakness.   Gastrointestinal: Negative for abdominal pain and bowel incontinence.   Allergic/Immunologic: Negative for hives and persistent infections.  Genitourinary: Negative for urinary retention/incontinence and nocturia.   Neurological: negative for disturbances in coordination, no myelopathic symptoms such as handwriting changes or difficulty with buttons, coins, keys or small objects. No loss of balance and seizures.   Psychiatric/Behavioral: Negative for depression. The patient does not have insomnia.   Denies myelopathic symptoms, perineal paresthesias, bowel or bladder incontinence    EXAM:  There were no vitals taken for this visit.    My physical examination was notable for the following findings:     Musculoskeletal and neuro exam stable    IMAGING:  No new imaging today.    Today I personally re-reviewed AP, Lat and Flex/Ex  upright C-spine films that demonstrate degenerative changes at C5-6.     MRI cervical demonstrates mild disc osteophyte complex at C5-6.     MRI lumbar demonstrates unchanged 1.2 x 0.8 x 0.6 cm intradural lesion at the L2 level (8:10 and 7:22).  No convincing enhancement. Stability favors a benign process such as a myxopapillary ependymoma, schwannoma, or meningioma. Multilevel degenerative changes, most advanced at L4-5 where there is degenerative grade 1 anterolisthesis resulting in mild canal stenosis and moderate bilateral foraminal stenosis.     EMG BUE demonstrates severe bilateral carpal tunnel syndrome.    There is no height or weight on file to calculate BMI.    Hemoglobin A1C   Date Value Ref Range Status   11/16/2023 5.4 4.0 - 5.6 % Final     Comment:     ADA Screening Guidelines:  5.7-6.4%  Consistent with prediabetes  >or=6.5%  Consistent with diabetes    High levels of fetal hemoglobin interfere with the HbA1C  assay. Heterozygous hemoglobin variants (HbS, HgC, etc)do  not significantly interfere with this assay.    However, presence of multiple variants may affect accuracy.           ASSESSMENT/PLAN:    There are no diagnoses linked to this encounter.    Today we discussed at length all of the different treatment options including anti-inflammatories, acetaminophen, rest, ice, heat, physical therapy including strengthening and stretching exercises, home exercises, ROM, aerobic conditioning, aqua therapy, other modalities including ultrasound, massage, and dry needling, epidural steroid injections and finally surgical intervention.      Pt presents with chronic cervical and lumbar radiculopathy. Given no high grade stenosis on MRI cervical and no chronic cervical radiculopathy on EMG, no surgical intervention indicated at this time. Will renew PT orders. Will reach out to pain management regarding patient's questions about long term disability, as we only deal with short term disability for surgical patients.                             This service was not originating from a related E/M service provided within the previous 7 days nor will  to an E/M service or procedure within the next 24 hours or my soonest available appointment.  Prevailing standard of care was able to be met in this audio-only visit.

## 2024-01-09 ENCOUNTER — OFFICE VISIT (OUTPATIENT)
Dept: HEPATOLOGY | Facility: CLINIC | Age: 59
End: 2024-01-09
Payer: COMMERCIAL

## 2024-01-09 DIAGNOSIS — E66.9 OBESITY (BMI 30.0-34.9): ICD-10-CM

## 2024-01-09 DIAGNOSIS — R74.8 ELEVATED LIVER ENZYMES: ICD-10-CM

## 2024-01-09 DIAGNOSIS — K76.0 NAFLD (NONALCOHOLIC FATTY LIVER DISEASE): Primary | ICD-10-CM

## 2024-01-09 PROBLEM — E66.811 OBESITY (BMI 30.0-34.9): Status: ACTIVE | Noted: 2024-01-09

## 2024-01-09 PROCEDURE — 99214 OFFICE O/P EST MOD 30 MIN: CPT | Mod: 95,,, | Performed by: NURSE PRACTITIONER

## 2024-01-09 NOTE — PROGRESS NOTES
[facility] - Established Patient    Last Clinic Visit: 11/16/23     Chief Complaint: Follow-up for fatty liver.      HISTORY     This is a 58 y.o. male with PMH noted below, here for follow-up of fatty liver.     He was first told about having fatty liver in his 20s or 30s.   This was noted on CT in our system 10/24/23; no findings to suggest advanced fibrosis.     Risk factors for fatty liver include:   BMI >30  HTN, HLD  No DM  No heavy alcohol use     His transaminases have been elevated since at least 2018, ALT>AST, <100. TBili also chronically elevated though mainly indirect and <2 (Gilbert's suspected). Platelets low end of normal, 150-160s.    Serologic workup has been negative for hemochromatosis and viral hepatitis.    Fibrosis staging:   Fibroscan 11/2023 = F0-F1, S3    Interval history:  Feels well, no new concerns from liver standpoint.     No symptoms of hepatic decompensation including jaundice, ascites, cognitive problems to suggest hepatic encephalopathy, or GI bleeding.     Updates on risk factors for fatty liver:  Weight -- BMI 32  Weight is down 7-8 lb over the last few months                 Dyslipidemia -- no recent lipid panel for review  On statin                                 Insulin resistance / diabetes -- no          Alcohol use -- 6 pack every 2-3 weeks    Health Maintenance:  - Most recent imaging: abd US 12/2023 with 0.9 cm liver cyst, no concerning liver lesions  - Hepatitis A & B vaccination: needs vaccines          Past medical history, surgical history, problem list, family history, social history, allergies: Reviewed and updated in the appropriate section of the electronic medical record.      Current Outpatient Medications   Medication Sig Dispense Refill    allopurinol (ZYLOPRIM) 300 MG tablet Take 300 mg by mouth once daily.      dexlansoprazole (DEXILANT) 60 mg capsule Take 1 capsule by mouth every morning.      doxycycline (PERIOSTAT) 20 MG tablet Take 20 mg  by mouth 2 (two) times daily.      ketorolac (TORADOL) 10 mg tablet Take 1 tablet (10 mg total) by mouth every 6 (six) hours as needed for Pain. 12 tablet 0    metoprolol succinate (TOPROL-XL) 50 MG 24 hr tablet Take 50 mg by mouth 2 (two) times daily.      oxybutynin (DITROPAN) 5 MG Tab Take 1 tablet (5 mg total) by mouth 3 (three) times daily as needed (bladder spasm). 30 tablet 0    simvastatin (ZOCOR) 20 MG tablet Take 20 mg by mouth once daily.      tamsulosin (FLOMAX) 0.4 mg Cap Take 1 capsule (0.4 mg total) by mouth every evening. for 30 doses 30 capsule 0    ursodioL (ACTIGALL) 300 mg capsule Take 1 capsule (300 mg total) by mouth 2 (two) times daily. 60 capsule 11     No current facility-administered medications for this visit.     Medication list reviewed and updated.      Review of Systems - as per HPI  Constitutional: Negative for unexpected weight change.   Respiratory: Negative for shortness of breath.    Cardiovascular: Negative for leg swelling.  Gastrointestinal: Negative for abdominal distention or abdominal pain. Negative for melena or hematemesis.  Musculoskeletal: Negative for myalgias.    Skin: Negative for jaundice or itching.  Neurological: Negative for confusion or slowed mentation. Negative for tremors.   Hematological: Does not bruise/bleed easily.       Physical Exam - limited by virtual visit  Constitutional: No distress. Alert and oriented.  Pulmonary/Chest: No respiratory distress.   Skin: No jaundice.   Psychiatric: Normal mood and affect. Speech, behavior, and thought content normal.        LABS & DIAGNOSTIC STUDIES     I have personally reviewed pertinent laboratory findings:    Lab Results   Component Value Date    ALT 89 (H) 11/16/2023    AST 52 (H) 11/16/2023    ALKPHOS 84 11/16/2023    BILITOT 1.7 (H) 11/16/2023    ALBUMIN 4.4 11/16/2023    INR 1.0 12/11/2023       Lab Results   Component Value Date    WBC 5.37 11/16/2023    HGB 16.3 11/16/2023    HCT 46.2 11/16/2023    MCV 87  "11/16/2023     11/16/2023       Lab Results   Component Value Date     10/24/2023    K 4.4 10/24/2023    BUN 12 10/24/2023    CREATININE 0.8 11/28/2023    ESTGFRAFRICA >60 09/12/2018    EGFRNONAA >60 09/12/2018       Lab Results   Component Value Date    FERRITIN 163 11/16/2023    FESATURATED 38 11/16/2023    HEPBSAG Non-reactive 11/16/2023    HEPBCAB Non-reactive 11/16/2023    HEPCAB Non-reactive 11/16/2023       No results found for: "AFP"      I have personally reviewed the following result reports:  Abdominal US - 12/2023  CT - 10/2023      ASSESSMENT & PLAN     58 y.o. male with:    1. NAFLD (nonalcoholic fatty liver disease)    2. Elevated liver enzymes    3. Obesity (BMI 30.0-34.9)        Fibroscan shows severe steatosis but reassuring, no-minimal fibrosis (F0-F1). Liver enzymes increasing last year with weight gain.    Recommendations:  He would like to repeat Fibroscan in 1 year after weight loss efforts.    Anticipate improvement in liver enzymes with weight loss. If transaminases increase, can obtain further serologic workup to rule out other causes of liver disease.   Recommend labs to monitor liver enzymes/LFTs 1-2 times per year with PCP  US surveillance every 2 years  Recommend vaccination for hepatitis A/B if not immune  Low carbohydrate/sugar, high protein/fiber diet. He plans to meet with a dietician soon.  Maintain good control of metabolic risk factors, specifically cholesterol      Orders Placed This Encounter   Procedures    FibroScan [place] (Vibration Controlled Transient Elastography)       Return to clinic in 1 year with Fibroscan.       Thank you for allowing me to participate in the care of A[facility]N [facility]  Hepatology          The patient location is: [place]  The chief complaint leading to consultation is: F/u for fatty liver    Visit type: audiovisual    Face to Face time with patient: 15 min  30 minutes of total time spent on the encounter, " which includes face to face time and non-face to face time preparing to see the patient (eg, review of tests), Obtaining and/or reviewing separately obtained history, Documenting clinical information in the electronic or other health record, Independently interpreting results (not separately reported) and communicating results to the patient/family/caregiver, or Care coordination (not separately reported).     Each patient to whom he or she provides medical services by telemedicine is:  (1) informed of the relationship between the physician and patient and the respective role of any other health care provider with respect to management of the patient; and (2) notified that he or she may decline to receive medical services by telemedicine and may withdraw from such care at any time.

## 2024-01-09 NOTE — PATIENT INSTRUCTIONS
Weight loss efforts -- check out the Lose It paulo  Repeat Fibroscan and visit in 1 year  Labs at the end of the year with PCP

## 2024-03-12 ENCOUNTER — TELEPHONE (OUTPATIENT)
Dept: SURGERY | Facility: CLINIC | Age: 59
End: 2024-03-12
Payer: COMMERCIAL

## 2024-03-12 NOTE — TELEPHONE ENCOUNTER
Pt called to inquire if an Abd US could be ordered so that it can be determined if Actigall is working.  He reports occasional RUQ pain and nausea but denies vomiting.  That pain and nausea usually passes and then generally feels well.  Per Dr. Interiano, he should call with continued or worsening symptoms of RUQ pain, N/V and an US will be ordered.  He verbalizes understanding and is agreeable to this plan of care.

## 2024-04-02 NOTE — PROGRESS NOTES
DATE: 4/2/2024  PATIENT: John Salcido    Attending Physician: Josh Peterson MD    HISTORY:  John Salcido is a 58 y.o. male who returns to me today for follow up.  He was last seen by me 12/22/2023.  Today he is doing well but notes he continues to have pain in his lower back that radiates down the sides of both legs. He describes the pain as burning. He has tried gabapentin and PT for 8 weeks in the last 6 months with no relief.    The Patient denies myelopathic symptoms such as handwriting changes or difficulty with buttons/coins/keys. Denies perineal paresthesias, bowel/bladder dysfunction.    PMH/PSH/FamHx/SocHx:  Unchanged from prior visit    ROS:  REVIEW OF SYSTEMS:  Constitution: Negative. Negative for chills, fever and night sweats.   HENT: Negative for congestion and headaches.    Eyes: Negative for blurred vision, left vision loss and right vision loss.   Cardiovascular: Negative for chest pain and syncope.   Respiratory: Negative for cough and shortness of breath.    Endocrine: Negative for polydipsia, polyphagia and polyuria.   Hematologic/Lymphatic: Negative for bleeding problem. Does not bruise/bleed easily.   Skin: Negative for dry skin, itching and rash.   Musculoskeletal: Negative for falls and muscle weakness.   Gastrointestinal: Negative for abdominal pain and bowel incontinence.   Allergic/Immunologic: Negative for hives and persistent infections.  Genitourinary: Negative for urinary retention/incontinence and nocturia.   Neurological: negative for disturbances in coordination, no myelopathic symptoms such as handwriting changes or difficulty with buttons, coins, keys or small objects. No loss of balance and seizures.   Psychiatric/Behavioral: Negative for depression. The patient does not have insomnia.   Denies perineal paresthesias, bowel or bladder incontinence    EXAM:  There were no vitals taken for this visit.    My physical examination was notable for the following findings:      Musculoskeletal and neuro exam stable      IMAGING:  No new imaging today.    Today I personally re-reviewed AP, Lat and Flex/Ex  upright C-spine films that demonstrate degenerative changes at C5-6.     MRI cervical demonstrates mild disc osteophyte complex at C5-6.     MRI lumbar demonstrates unchanged 1.2 x 0.8 x 0.6 cm intradural lesion at the L2 level (8:10 and 7:22).  No convincing enhancement. Stability favors a benign process such as a myxopapillary ependymoma, schwannoma, or meningioma. Multilevel degenerative changes, most advanced at L4-5 where there is degenerative grade 1 anterolisthesis resulting in mild canal stenosis and moderate bilateral foraminal stenosis.     EMG BUE demonstrates severe bilateral carpal tunnel syndrome.    There is no height or weight on file to calculate BMI.    Hemoglobin A1C   Date Value Ref Range Status   11/16/2023 5.4 4.0 - 5.6 % Final     Comment:     ADA Screening Guidelines:  5.7-6.4%  Consistent with prediabetes  >or=6.5%  Consistent with diabetes    High levels of fetal hemoglobin interfere with the HbA1C  assay. Heterozygous hemoglobin variants (HbS, HgC, etc)do  not significantly interfere with this assay.   However, presence of multiple variants may affect accuracy.           ASSESSMENT/PLAN:    There are no diagnoses linked to this encounter.    Today we discussed at length all of the different treatment options including anti-inflammatories, acetaminophen, rest, ice, heat, physical therapy including strengthening and stretching exercises, home exercises, ROM, aerobic conditioning, aqua therapy, other modalities including ultrasound, massage, and dry needling, epidural steroid injections and finally surgical intervention.      Pt presents with chronic low back pain and bilateral lumbar radiculopathy. Failure of conservative rx. Will order bilateral L4-5 TFESI with pain management. Pt will fu if pain persists.

## 2024-04-04 ENCOUNTER — OFFICE VISIT (OUTPATIENT)
Dept: ORTHOPEDICS | Facility: CLINIC | Age: 59
End: 2024-04-04
Payer: COMMERCIAL

## 2024-04-04 VITALS — BODY MASS INDEX: 33.42 KG/M2 | WEIGHT: 220.5 LBS | HEIGHT: 68 IN

## 2024-04-04 DIAGNOSIS — M51.36 DDD (DEGENERATIVE DISC DISEASE), LUMBAR: Primary | ICD-10-CM

## 2024-04-04 PROCEDURE — 99213 OFFICE O/P EST LOW 20 MIN: CPT | Mod: S$GLB,,, | Performed by: ORTHOPAEDIC SURGERY

## 2024-04-04 PROCEDURE — 1159F MED LIST DOCD IN RCRD: CPT | Mod: CPTII,S$GLB,, | Performed by: ORTHOPAEDIC SURGERY

## 2024-04-04 PROCEDURE — 99999 PR PBB SHADOW E&M-EST. PATIENT-LVL III: CPT | Mod: PBBFAC,,, | Performed by: ORTHOPAEDIC SURGERY

## 2024-04-04 PROCEDURE — 3008F BODY MASS INDEX DOCD: CPT | Mod: CPTII,S$GLB,, | Performed by: ORTHOPAEDIC SURGERY

## 2024-04-18 ENCOUNTER — TELEPHONE (OUTPATIENT)
Dept: PAIN MEDICINE | Facility: CLINIC | Age: 59
End: 2024-04-18
Payer: COMMERCIAL

## 2024-04-18 DIAGNOSIS — M54.10 RADICULOPATHY, UNSPECIFIED SPINAL REGION: Primary | ICD-10-CM

## 2024-04-18 NOTE — TELEPHONE ENCOUNTER
----- Message from Shine Moore MD sent at 2024  9:44 AM CDT -----  Regarding: RE: Order for LAMAR SIMMONS  Procedure  ----- Message -----  From: Olivia Chawla LPN  Sent: 2024   9:17 AM CDT  To: Shine Moore MD  Subject: FW: Order for LAMAR SIMMONS                       Clinic appt or procedure?  ----- Message -----  From: Lianna Fitzpatrick PA-C  Sent: 2024   4:06 PM CDT  To: [facility] Schedulers  Subject: Order for LAMAR SIMMONS                             Patient Name: LAMAR SIMMONS([id number])  Sex: Male  : 1965      PCP: KAVIN COLE    Center: [facility]     Types of orders made on 2024: Procedure Request    Order Date:2024  Ordering User:LIANNA FITZPATRICK [[id number]]  Encounter Provider:Lianna Fitzpatrick PA-C [[id number]]  Authorizing Provider  : Lianna Fitzpatrick PA-C [[id number]]  Supervising Provider:KANDACE HUNTLEY [[id number]]  Type of Supervision:Supervision Required  Department:[facility][[id number]]    Common Order Information  Procedure -> Transforaminal Injection (Specify level and laterality) Cmt: bilat             L4-5    Order Specific Information  Order: Procedure Order to Pain Management [Custom: [id number]]  Order #:          [id number]: 1   FUTURE    Priority: Routine  Class: Clinic Performed    Future Order Information      Expires on:2025            Expected by:2024                   Associated Diagnoses      M51.36 DDD (degenerative disc disease), lumbar      Facility Name: [facility]           Priority: Routine  Class: Clinic Performed    Future Order Information      Expires on:2025          Z1     Expected by:2024                   Associated Diagnoses      M51.36 DDD (degenerative disc disease), lumbar      Procedure -> Transforaminal Injection (Specify level and laterality) Cmt:                 bilat L4-5        Facility Name: [facility]

## 2024-04-22 ENCOUNTER — TELEPHONE (OUTPATIENT)
Dept: PAIN MEDICINE | Facility: CLINIC | Age: 59
End: 2024-04-22
Payer: COMMERCIAL

## 2024-04-22 NOTE — TELEPHONE ENCOUNTER
----- Message from Gay Hsieh sent at 4/22/2024  4:27 PM CDT -----  Regarding: PT ADVICE  Contact: PT  Pt called to cancel his injection on 5.10.24.    Please advise. Pt can be reached at  [phone].

## 2024-04-23 NOTE — TELEPHONE ENCOUNTER
Spoke with patient, will cancel for 5/10. Ded not met, does not want to pay for inj if potential it will not work. Will call if he changes his mind or wants to reschedule.

## 2024-05-02 RX ORDER — METHYLPREDNISOLONE 4 MG/1
TABLET ORAL
Qty: 1 EACH | Refills: 0 | Status: SHIPPED | OUTPATIENT
Start: 2024-05-02 | End: 2024-05-23

## 2024-06-18 ENCOUNTER — OFFICE VISIT (OUTPATIENT)
Dept: NEUROLOGY | Facility: CLINIC | Age: 59
End: 2024-06-18
Payer: COMMERCIAL

## 2024-06-18 DIAGNOSIS — R41.89 COGNITIVE CHANGES: ICD-10-CM

## 2024-06-18 DIAGNOSIS — F43.22 ADJUSTMENT DISORDER WITH ANXIOUS MOOD: ICD-10-CM

## 2024-06-18 DIAGNOSIS — F33.1 MODERATE EPISODE OF RECURRENT MAJOR DEPRESSIVE DISORDER: Primary | ICD-10-CM

## 2024-06-18 PROCEDURE — 90791 PSYCH DIAGNOSTIC EVALUATION: CPT | Mod: 93,,, | Performed by: CLINICAL NEUROPSYCHOLOGIST

## 2024-06-18 PROCEDURE — 99499 UNLISTED E&M SERVICE: CPT | Mod: 93,,, | Performed by: CLINICAL NEUROPSYCHOLOGIST

## 2024-06-24 PROBLEM — F33.1 MODERATE EPISODE OF RECURRENT MAJOR DEPRESSIVE DISORDER: Status: ACTIVE | Noted: 2024-06-24

## 2024-06-24 PROBLEM — F43.22 ADJUSTMENT DISORDER WITH ANXIOUS MOOD: Status: ACTIVE | Noted: 2024-06-24

## 2024-06-24 PROBLEM — R41.89 COGNITIVE CHANGES: Status: ACTIVE | Noted: 2024-06-24

## 2024-06-24 NOTE — ASSESSMENT & PLAN NOTE
Assessment:  -16yo-40yo: Ongoing depression with periods of SI more notable in late teens/early 20s; Had some therapy without much improvement and no improvement after trying a few SSRIs.   -Sxs spontaneously improved around 39.   -54yo-Current: Reported onset of depression again in the context of work injury and other stressors. He saw a worker's comp psychiatrist a few times without any improvement.   -likely multi-factorial including longstanding depression and stress-related  Plan:  -will assess severity and how much sxs are partially vs wholly contributing to cognitive difficulties

## 2024-06-24 NOTE — ASSESSMENT & PLAN NOTE
"Assessment  Onset: Gradual around 7-8 years ago without known precipitant. Sxs: Short-term memory trouble at onset (forgetting conversations, tasks, needing more reminders)   Course: Slightly worse over time but more accelerated decline over the past few years. He noted a facial injury in December 2020 and ongoing mood sxs.   Day to day: Sxs vary considerably week to week. The past few days "not much trouble" and the week prior "it was really bad for a few days." He notes stress and mood sxs may contribute but he isn't certain.      Plan:  -neuropsych testing with report/treatment plan to follow    "

## 2024-07-09 ENCOUNTER — OFFICE VISIT (OUTPATIENT)
Dept: NEUROLOGY | Facility: CLINIC | Age: 59
End: 2024-07-09
Payer: COMMERCIAL

## 2024-07-09 DIAGNOSIS — R41.3 MEMORY LOSS: ICD-10-CM

## 2024-07-09 DIAGNOSIS — F33.1 MODERATE EPISODE OF RECURRENT MAJOR DEPRESSIVE DISORDER: ICD-10-CM

## 2024-07-09 DIAGNOSIS — R41.89 COGNITIVE CHANGES: Primary | ICD-10-CM

## 2024-07-09 DIAGNOSIS — Z81.8 FAMILY HISTORY OF DEMENTIA: ICD-10-CM

## 2024-07-09 DIAGNOSIS — F43.22 ADJUSTMENT DISORDER WITH ANXIOUS MOOD: ICD-10-CM

## 2024-07-09 PROCEDURE — 96133 NRPSYC TST EVAL PHYS/QHP EA: CPT | Mod: S$GLB,,, | Performed by: CLINICAL NEUROPSYCHOLOGIST

## 2024-07-09 PROCEDURE — 96138 PSYCL/NRPSYC TECH 1ST: CPT | Mod: S$GLB,,, | Performed by: CLINICAL NEUROPSYCHOLOGIST

## 2024-07-09 PROCEDURE — 96139 PSYCL/NRPSYC TST TECH EA: CPT | Mod: S$GLB,,, | Performed by: CLINICAL NEUROPSYCHOLOGIST

## 2024-07-09 PROCEDURE — 96132 NRPSYC TST EVAL PHYS/QHP 1ST: CPT | Mod: S$GLB,,, | Performed by: CLINICAL NEUROPSYCHOLOGIST

## 2024-07-09 PROCEDURE — 99499 UNLISTED E&M SERVICE: CPT | Mod: S$GLB,,, | Performed by: CLINICAL NEUROPSYCHOLOGIST

## 2024-07-09 PROCEDURE — 99999 PR PBB SHADOW E&M-EST. PATIENT-LVL II: CPT | Mod: PBBFAC,,, | Performed by: CLINICAL NEUROPSYCHOLOGIST

## 2024-07-11 RX ORDER — GABAPENTIN 300 MG/1
300 CAPSULE ORAL 3 TIMES DAILY
Qty: 90 CAPSULE | Refills: 11 | Status: SHIPPED | OUTPATIENT
Start: 2024-07-11 | End: 2025-07-11

## 2024-07-18 NOTE — ASSESSMENT & PLAN NOTE
"Assessment:  -Cognitive Testing: Results do show cognitive inefficiency particularly for tasks involving mental speed, attention, and aspects of executive functioning. These inefficiencies impacting his ability to initially learn new verbal information. But, his recall or "memory" is fairly consistent with his level of learning - meaning no primary memory disorder, which is good news. Most other cognitive skills were within expectations.     -Etiology/Severity:   Depression and anxiety are most likely primary contributors to cognitive trouble. Depression is particularly severe and needs to be more intensively addressed.   Course of sxs (days without sxs when mood is better) is not generally c/w any neurodegenerative process. So, this is good news.   Various medical co-morbidities (poor slpee, pain, vascular health) is also likely playing a role.     Plan:  Neuropsychology Follow-up Feedback session to review results/plan of care   No follow-up.  If Mr. Salcido or others notice reductions in functioning, repeat evaluation is recommended.   Mental Health Follow-up Psychiatry/Medical Psychology: Since psych symptoms are interfering with cognition, referring for medication evaluation.   Psychology/Therapy: Referring for psychotherapy via [email or web address] for scheduling.   Pain Symptoms Behavioral Pain Management: Patients who have not significantly benefited from traditional approaches to pain management (meds, physical therapy) generally need an added behavioral approach. This involves cognitive or 'mental' strategies to ignore, distract, reduce reactivity, and cope better with ongoing chronic and significant pain.          Recommendations for Mr. Salcido and Caregivers/Family:   Brain Health: Engage in regular exercise, which increases alertness and arousal and can improve attention and focus.    Get a good nights sleep, as this can enhance alertness and cognition.  Eat healthy foods and balanced meals. It is " notable that research indicates certain nutrients may aid in brain function, such as B vitamins (especially B6, B12, and folic acid), antioxidants (such as vitamins C and E, and beta carotene), and Omega-3 fatty acids. Talk with your physician or nutritionist about whats right for you.   Keep your brain active. Find activities to stay mentally active, such as reading, games (cards, checkers), puzzles (crosswords, Sudoku, jig saw), crafts (models, woodworking), gardening, or participating in activities in the community.  Stay socially engaged. Continue staying active with your family and friends.   Sleep Tips Poor sleep has a negative effect on cognition. Several strategies have been shown to improve sleep:   Caffeine intake in the afternoon and evening, as well as stuffing oneself at supper, can decrease the quality of restful sleep throughout the night.   Bedtime and wake-up times should be consistent every night and morning so the body becomes used to a single routine, even on the weekends.  Engage in daily physical activity, but not 2-3 hours before bedtime.   No technology use (television, computer, iPad) 1-2 hours before bed.   Have a wind down routine (e.g., soft lights in the house, bath before bed, reduced fluid intake, songs, reading, less noise) to promote sleep readiness.   Visit the www.sleepfoundation.org for more strategies.

## 2024-07-18 NOTE — PROGRESS NOTES
"Outpatient Neuropsychological Evaluation  Referral Information  Name: John Salcido  MRN: [id number]  : 1965  Age: 58 y.o.  Gender: male  Referring Provider: Cristy Lock Md  [id number] [place] [address]  The chief complaint leading to consultation/medical necessity is: Cognitive and mood concerns  Visit type: Testing, report, treatment plan with initial visit on 2024    SUMMARY/TREATMENT PLAN   Results indicate the following diagnoses and treatment plan recommendations. This will be discussed in a separately scheduled treatment planning and feedback session    Diagnoses/Plan:  Problem List Items Addressed This Visit          Neuro    Cognitive changes - Primary    Current Assessment & Plan     Assessment:  -Cognitive Testing: Results do show cognitive inefficiency particularly for tasks involving mental speed, attention, and aspects of executive functioning. These inefficiencies impacting his ability to initially learn new verbal information. But, his recall or "memory" is fairly consistent with his level of learning - meaning no primary memory disorder, which is good news. Most other cognitive skills were within expectations.     -Etiology/Severity:   Depression and anxiety are most likely primary contributors to cognitive trouble. Depression is particularly severe and needs to be more intensively addressed.   Course of sxs (days without sxs when mood is better) is not generally c/w any neurodegenerative process. So, this is good news.   Various medical co-morbidities (poor slpee, pain, vascular health) is also likely playing a role.     Plan:  Neuropsychology Follow-up Feedback session to review results/plan of care   No follow-up.  If Mr. Salcido or others notice reductions in functioning, repeat evaluation is recommended.   Mental Health Follow-up Psychiatry/Medical Psychology: Since psych symptoms are interfering with cognition, referring for medication evaluation. "   Psychology/Therapy: Referring for psychotherapy via [email or web address].com for scheduling.   Pain Symptoms Behavioral Pain Management: Patients who have not significantly benefited from traditional approaches to pain management (meds, physical therapy) generally need an added behavioral approach. This involves cognitive or 'mental' strategies to ignore, distract, reduce reactivity, and cope better with ongoing chronic and significant pain.          Recommendations for Mr. Salcido and Caregivers/Family:   Brain Health: Engage in regular exercise, which increases alertness and arousal and can improve attention and focus.    Get a good nights sleep, as this can enhance alertness and cognition.  Eat healthy foods and balanced meals. It is notable that research indicates certain nutrients may aid in brain function, such as B vitamins (especially B6, B12, and folic acid), antioxidants (such as vitamins C and E, and beta carotene), and Omega-3 fatty acids. Talk with your physician or nutritionist about whats right for you.   Keep your brain active. Find activities to stay mentally active, such as reading, games (cards, checkers), puzzles (crosswords, Intuitive User Interfacesoku, jig saw), crafts ([employer], woodworking), gardening, or participating in activities in the community.  Stay socially engaged. Continue staying active with your family and friends.   Sleep Tips Poor sleep has a negative effect on cognition. Several strategies have been shown to improve sleep:   Caffeine intake in the afternoon and evening, as well as stuffing oneself at supper, can decrease the quality of restful sleep throughout the night.   Bedtime and wake-up times should be consistent every night and morning so the body becomes used to a single routine, even on the weekends.  Engage in daily physical activity, but not 2-3 hours before bedtime.   No technology use (television, computer, iPad) 1-2 hours before bed.   Have a wind down routine (e.g., soft lights in  "the house, bath before bed, reduced fluid intake, songs, reading, less noise) to promote sleep readiness.   Visit the www.sleepfoundation.org for more strategies.               Psychiatric    Moderate episode of recurrent major depressive disorder    Adjustment disorder with anxious mood     Other Visit Diagnoses       Memory loss        Family history of dementia                    HPI AND CURRENT SYMPTOMS     Mr. Salcido has active problems noted below.    Cognitive Symptoms   Onset: Gradual around 7-8 years ago without known precipitant. Sxs: Short-term memory trouble at onset (forgetting conversations, tasks, needing more reminders)   Course: Slightly worse over time but more accelerated decline over the past few years. He noted a facial injury in December 2020 and ongoing mood sxs.     On 12/2020, he had a work-related accident ("a hook came loose and hit my face"). He noted brief LOC but immediately afterward recalls telling a co-worker he would "try the hook again." But,  said he had blood on his face and had been hit by the hook.  He noted, "I got cleaned up at a lavatory" where "I washed my face and told the manager who had a  bring me to the ED." He was evaluated and discharged without any concussion diagnosis.      Mr. Salcido noted he had workman's comp issues afterward and is also in a lawsuit related to the injury. [Note: I provided informed consent regarding legal issues and clinical cases. He noted, "I'm not doing this for the case" and added "May be the accident has nothing to do with it" reference his cognitive sxs. He added further "They didn't even say at ER that I had a concussion. A psychiatrist Tucker Mario said I may have had a concussion." He noted that happened around "2021 or 2022" related to Workman's Compensation evaluation.      Day to day: Sxs vary considerably week to week. The past few days "not much trouble" and the week prior "it was really bad for a few days." He notes " "stress and mood sxs may contribute but he isn't certain.      Current Functional Status/Needs   ADLs:   Self-Care Eating Safety   Bathing: Independent  Bathroom: Independent  Other: Independent Independent     Instrumental IADLs:    Driving Medications/Health Household Finances   Independent  Independent and knew his medications No major issues but notes wife will remind him more to do things compared to previous years.   Independent and they manage together     Current Psychiatric and Behavioral Symptoms   Mood:    Depression/Dysphoria Anxiety/Fearfulness Irritability   2021-Current: "Really down" and worsening over the past few years. Sxs: Irritability, social withdrawal, down, low self-esteem.  2021-Current: Ongoing worry/rumination and sxs are worsening over time. Some anger issues pre-accident but much worse since 12/2020 accident and has been worsening since 2020  Primary trigger is when he feels like he can't do something well.      Behavior:   Agitation/Resistance Delusions/Paranoia Hallucinations   None None None   Apathy/Motivation Repetitive/Restlessness Other   None None None     Neurovegetative:   Sleep/Nighttime  [employer]   "Not really" for years  5-7 hours but broken up  Supplements and OTC are variably helpful Good Varies     Suicidal/Homicidal Ideation: He reports no plan or intent. But, has 2-3x daily of picturing himself dead or picturing himself killing himself (shooting himself). This occurs when he is particularly down on himself but the image in his mind is fleeting lasting maybe a minute or less. This pattern started 1-2 years ago and has is a little worse now compared to onset. He reports no current worry about ever "doing it" and denies again any intent/plan. "I'm not in any danger of doing anything." Historically, no prior attempts, no recent time when sxs worsened with increased SI, and no current planning/intent.    Safety Plan: He talks to his wife about his sxs. He notes if sxs " worsen from currently, then he will immediately talk with his wife. If she's not there, he will call her or his daughter. If they are not available, then he agreed to call the Crisis Hotline (I provided number for him) or 911.     Current Physical Concerns   Motor: Sensory Other   No tremor  No major walking problems Reports hearing  Back pain: Worsens when he is active or sits for a long time. Sxs are worse in the past month but is vague on reason.      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, loading, etc.)   Family History   Neurologic History Dementia (Mother started between 69-71 and  from COPD exacerbation at 74; Maternal grandmother also around 70s at onset; noted other relatives on mom's side)   Psychiatric History No known heritable risk factors       MEDICAL STATUS     Patient Active Problem List   Diagnosis    Calculus of kidney    Renal colic    Calculus of ureter    Gross hematuria    DDD (degenerative disc disease), lumbar    Lumbar pain    Cervical pain (neck)    Neck stiffness    Decreased functional mobility and endurance    Diastasis recti    Elevated liver enzymes    NAFLD (nonalcoholic fatty liver disease)    Obesity (BMI 30.0-34.9)    Cognitive changes    Moderate episode of recurrent major depressive disorder    Adjustment disorder with anxious mood     Past Medical History:   Diagnosis Date    High cholesterol     Hypertension     Kidney stones      Past Surgical History:   Procedure  Laterality Date    CYSTOSCOPY N/A 12/13/2023    Procedure: CYSTOSCOPY;  Surgeon: Loli Castillo MD;  Location: [facility] OR 1ST FLR;  Service: Urology;  Laterality: N/A;    CYSTOSCOPY W/ RETROGRADES Bilateral 9/13/2018    Procedure: CYSTOSCOPY WITH RETROGRADE PYELOGRAM;  Surgeon: Francisco Pena MD;  Location: [facility] OR;  Service: Urology;  Laterality: Bilateral;    EXTRACTION - STONE Bilateral 12/13/2023    Procedure: EXTRACTION - STONE;  Surgeon: Loli Castillo MD;  Location: [facility] OR 1ST FLR;  Service: Urology;  Laterality: Bilateral;    LASER LITHOTRIPSY Left 9/13/2018    Procedure: LITHOTRIPSY-LASER;  Surgeon: Francisco Pena MD;  Location: [facility] OR;  Service: Urology;  Laterality: Left;    LASER LITHOTRIPSY Bilateral 12/13/2023    Procedure: LITHOTRIPSY, USING LASER;  Surgeon: Loli Castillo MD;  Location: [facility] OR 1ST FLR;  Service: Urology;  Laterality: Bilateral;    PLACEMENT-STENT Bilateral 12/13/2023    Procedure: PLACEMENT-STENT;  Surgeon: Loli Castillo MD;  Location: [facility] OR 1ST FLR;  Service: Urology;  Laterality: Bilateral;    PYELOSCOPY Bilateral 12/13/2023    Procedure: PYELOSCOPY;  Surgeon: Loli Castillo MD;  Location: [facility] OR 1ST FLR;  Service: Urology;  Laterality: Bilateral;    URETERAL STENT PLACEMENT Left 9/13/2018    Procedure: INSERTION, STENT, URETER;  Surgeon: Francisco Pena MD;  Location: [facility] OR;  Service: Urology;  Laterality: Left;    URETEROSCOPIC REMOVAL OF URETERIC CALCULUS Left 9/13/2018    Procedure: EXTRACTION-STONE-URETEROSCOPY WITH BASKET;  Surgeon: Francisco Pena MD;  Location: [facility] OR;  Service: Urology;  Laterality: Left;    URETEROSCOPY Left 9/13/2018    Procedure: URETEROSCOPY;  Surgeon: Francisco Pena MD;  Location: [facility] OR;  Service: Urology;  Laterality: Left;    URETEROSCOPY Bilateral 12/13/2023    Procedure: URETEROSCOPY;  Surgeon: Loli Castillo MD;  Location: [facility] OR [address] [place] [address];  Service: Urology;  Laterality:  "Bilateral;       Relevant Neurologic History   Falls NA   TBIs 12/2020: He had a facial injury with reported LOC. Records at ED note no LOC and no concussion diagnosis. Regardless, even he had a concussion, sxs were at most mild TBI range.   Seizures None   CVAs NA   Movement Concerns NA   Other NA     Relevant Labs   No results found for: "UQXQXTLI47"  No results found for: "RPR"  No results found for: "FOLATE"  No results found for: "TSH", "J2EGGNI", "U8OTCUR", "THYROIDAB"  Lab Results   Component Value Date    HGBA1C 5.4 11/16/2023     No results found for: "HIV1X2", "[id number]"    Neurodiagnostics   Results for orders placed or performed during the hospital encounter of 12/16/20   CT Head Without Contrast    Narrative    EXAMINATION:  CT HEAD WITHOUT CONTRAST    CLINICAL HISTORY:  Head trauma, mod-severe;    TECHNIQUE:  Low dose axial images were obtained through the head.  Coronal and sagittal reformations were also performed. Contrast was not administered.    COMPARISON:  None.    FINDINGS:  The CSF spaces, brain parenchyma, and bony calvarium are intact.There is no evidence of hemorrhage, mass, or acute infarct.    The visualized paranasal sinuses are clear.      Impression    Normal  Electronically signed by: Chiki Baumann Jr  Date:    12/16/2020  Time:    15:17         Medications     Current Outpatient Medications:     allopurinol (ZYLOPRIM) 300 MG tablet, Take 300 mg by mouth once daily., Disp: , Rfl:     dexlansoprazole (DEXILANT) 60 mg capsule, Take 1 capsule by mouth every morning., Disp: , Rfl:     doxycycline (PERIOSTAT) 20 MG tablet, Take 20 mg by mouth 2 (two) times daily., Disp: , Rfl:     gabapentin (NEURONTIN) 300 MG capsule, Take 1 capsule (300 mg total) by mouth 3 (three) times daily., Disp: 90 capsule, Rfl: 11    metoprolol succinate (TOPROL-XL) 50 MG 24 hr tablet, Take 50 mg by mouth 2 (two) times daily., Disp: , Rfl:     simvastatin (ZOCOR) 20 MG tablet, Take 20 mg by mouth once daily., " "Disp: , Rfl:     tamsulosin (FLOMAX) 0.4 mg Cap, Take 1 capsule (0.4 mg total) by mouth every evening. for 30 doses, Disp: 30 capsule, Rfl: 0    ursodioL (ACTIGALL) 300 mg capsule, Take 1 capsule (300 mg total) by mouth 2 (two) times daily., Disp: 60 capsule, Rfl: 11     Pertinent Psychiatric History   Sxs:   Longstanding:   -14yo-40yo: Ongoing depression with periods of SI more notable in late teens/early 20s; Sxs spontaneously improved around 39.   -54yo-Current: Reported onset of depression again in the context of work injury and other stressors.     Intermittent: NA    Substance Use:  Current Use: None  Prior Problems: None   Treatment Hx:  Medication: Yes  Current: None  Pertinent prior: Some trials of SSRIs without benefit in his late 20s-30s  Therapy: None  Current: None  Pertinent prior: 25yo had 3 sessions of therapy without much help; In 2021, saw psychiatrist for workman's comp for a few sessions.   Inpatient: None     BEHAVIORAL OBSERVATIONS   APPEARANCE Casually dressed and adequate grooming/hygiene   ALERTNESS/ATTENTION Attentive and alert.    GAIT Unremarkable   SENSORY Unremarkable   MOTOR  Unremarkable   SPEECH/LANGUAGE Normal in rate, rhythm, tone, and volume. No significant word finding difficulty noted. Expressive and receptive language was normal.   STATED MOOD/AFFECT stated mood was "fine" Affect was congruent with stated mood.   INTERPERSONAL BEHAVIOR Rapport was quickly and easily establishe   SI/HI None reported   HALLUCINATIONS/DELUSIONS None evidenced or endorsed   THOUGHT PROCESSES/INSIGHT Thoughts seemed logical and goal-directed   TEST TAKING BEHAVIOR and VALIDITY:  Freestanding and embedded performance validity measures and observation of effort were suggestive of adequate engagement. The current results, therefore, are likely a valid reflection of the patient's current functioning.      PROCEDURES/TESTS ADMINISTERED   In addition to performing a review of pertinent medical records, " reviewing limits to confidentiality, conducting a clinical interview, and explaining procedures, the following measures were administered: Todd Cognitive Assessment (MoCA), Advanced Clinical Solutions (ACS) Test of Pre-Morbid Functioning (TOPF), Green's MSVT, Wechsler Adult Intelligence Scale-Fourth Edition (WAIS-IV Digit Span, Arithmetic, Symbol Search, Coding Matrix Reasoning and Similarities), Trail Making Test (TMT-A&B; Mo, et al., 2004), Verbal Fluency Test(FAS/Animals; Mo, et al., 2004), NAB Naming Test, Terrell Complex Figure Copy Trial(Terrell CFT), Rush Verbal Learning Test - Revised (Form-1); Wechsler Memory Scale-Fourth Edition (WMS-IV) Logical Memory and Visual Reproduction subtests, Wisconsin Card Sorting Test (WCST-128), Grooved Pegboard (GPT; Mo et al., 2004), ARMEN-7/PHQ-9, Personality Assessment Inventory (RICHARD) Manual norms were used unless otherwise indicated.  Review data Appendix Below for scores and percentiles.     BILLING     Service Description CPT Code Minutes Units   Psychiatric diagnostic evaluation by physician [id number]     Neurobehavioral status exam by physician [id number]     Test Evaluation Services   Neuropsychological testing evaluation services by physician [phone]   Each additional hour by physician [phone]   Test Administration and Scoring   Psychological or neuropsychological test administration and scoring by technician [phone]   Each additional 30 minutes by technician [phone]       DATA APPENDIX:   Note: It is important to note that scores/percentiles should only be interpreted by a neuropsychologist. It is common for healthy individuals to have 1-3 isolated low/unusual scores that are not indicative of any significant cognitive dysfunction.

## 2024-07-24 ENCOUNTER — PATIENT MESSAGE (OUTPATIENT)
Dept: SURGERY | Facility: CLINIC | Age: 59
End: 2024-07-24
Payer: COMMERCIAL

## 2024-07-24 DIAGNOSIS — R10.11 ABDOMINAL PAIN, RUQ: ICD-10-CM

## 2024-07-24 DIAGNOSIS — K80.20 CALCULUS OF GALLBLADDER WITHOUT CHOLECYSTITIS WITHOUT OBSTRUCTION: Primary | ICD-10-CM

## 2024-07-25 ENCOUNTER — HOSPITAL ENCOUNTER (OUTPATIENT)
Dept: RADIOLOGY | Facility: HOSPITAL | Age: 59
Discharge: HOME | End: 2024-07-25
Attending: SURGERY
Payer: COMMERCIAL

## 2024-07-25 DIAGNOSIS — K80.20 CALCULUS OF GALLBLADDER WITHOUT CHOLECYSTITIS WITHOUT OBSTRUCTION: ICD-10-CM

## 2024-07-25 DIAGNOSIS — R10.11 ABDOMINAL PAIN, RUQ: ICD-10-CM

## 2024-07-25 PROCEDURE — 76700 US EXAM ABDOM COMPLETE: CPT | Mod: 26,,, | Performed by: RADIOLOGY

## 2024-07-25 PROCEDURE — 76700 US EXAM ABDOM COMPLETE: CPT | Mod: TC

## 2024-07-30 ENCOUNTER — OFFICE VISIT (OUTPATIENT)
Dept: SURGERY | Facility: CLINIC | Age: 59
End: 2024-07-30
Payer: COMMERCIAL

## 2024-07-30 VITALS
DIASTOLIC BLOOD PRESSURE: 82 MMHG | BODY MASS INDEX: 32.7 KG/M2 | HEART RATE: 60 BPM | SYSTOLIC BLOOD PRESSURE: 141 MMHG | HEIGHT: 68 IN | OXYGEN SATURATION: 95 % | WEIGHT: 215.75 LBS

## 2024-07-30 DIAGNOSIS — K80.20 CALCULUS OF GALLBLADDER WITHOUT CHOLECYSTITIS WITHOUT OBSTRUCTION: Primary | ICD-10-CM

## 2024-07-30 PROCEDURE — 1159F MED LIST DOCD IN RCRD: CPT | Mod: CPTII,S$GLB,, | Performed by: SURGERY

## 2024-07-30 PROCEDURE — 99213 OFFICE O/P EST LOW 20 MIN: CPT | Mod: S$GLB,,, | Performed by: SURGERY

## 2024-07-30 PROCEDURE — 3079F DIAST BP 80-89 MM HG: CPT | Mod: CPTII,S$GLB,, | Performed by: SURGERY

## 2024-07-30 PROCEDURE — 99999 PR PBB SHADOW E&M-EST. PATIENT-LVL III: CPT | Mod: PBBFAC,,, | Performed by: SURGERY

## 2024-07-30 PROCEDURE — 3077F SYST BP >= 140 MM HG: CPT | Mod: CPTII,S$GLB,, | Performed by: SURGERY

## 2024-07-30 PROCEDURE — 3008F BODY MASS INDEX DOCD: CPT | Mod: CPTII,S$GLB,, | Performed by: SURGERY

## 2024-07-30 NOTE — PROGRESS NOTES
Acute Care Surgery: History & Physical    Chief Complaint: F/U post abdominal ultrasound 7/25    Subjective:  Mr. Salcido is a 58 y.o. male who presents for a follow-up following an abdominal ultrasound finding no gallstones. On examination, Mr. Salcido is sitting comfortably in the room upon entering in no acute distress and in good spirits. He denies headaches, malaise, shortness of breath, chest pain, or nausea, vomiting, fever, chills, or nightsweats. He endorses LUQ and RUQ abdominal pain. Patient states that his stool consistency is more loose, and states that his appetite is down as well.    PMHx, SHx, FHx, SocHx, Allergies, Medications:  Mr. Salcido  has a past medical history of High cholesterol, Hypertension, and Kidney stones.     He  has a past surgical history that includes Cystoscopy w/ retrogrades (Bilateral, 9/13/2018); Laser lithotripsy (Left, 9/13/2018); Ureteroscopic removal of ureteric calculus (Left, 9/13/2018); Ureteroscopy (Left, 9/13/2018); Ureteral stent placement (Left, 9/13/2018); Cystoscopy (N/A, 12/13/2023); Ureteroscopy (Bilateral, 12/13/2023); Pyeloscopy (Bilateral, 12/13/2023); placement-stent (Bilateral, 12/13/2023); extraction - stone (Bilateral, 12/13/2023); and Laser lithotripsy (Bilateral, 12/13/2023).    He  reports that he has quit smoking. His smoking use included cigarettes. He has been exposed to tobacco smoke. He has never used smokeless tobacco. He reports current alcohol use. He reports that he does not use drugs.     Mr. Salcido's family history includes Cirrhosis in his father.  He has No Known Allergies.    Mr. Salcido has a current medication list which includes the following prescription(s): allopurinol, metoprolol succinate, simvastatin, ursodiol, dexlansoprazole, doxycycline, gabapentin, and tamsulosin.    ROS:  Pertinent items from 14 system review are noted in HPI, all others negative     Objective:  BP (!) 141/82 (BP Location: Left arm, Patient Position: Sitting, BP  "Method: Medium (Automatic))   Pulse 60   Ht 5' 8" (1.727 m)   Wt 97.8 kg (215 lb 11.5 oz)   SpO2 95%   BMI 32.80 kg/m²     Physical Exam:  Gen: no acute distress, alert and oriented  HEENT: extraocular movements intact   CV: regular rate and rhythm, bilateral radial pulses 2+    Pulm: no increased work of breathing, symmetrical chest rise  Abd: Soft, nontender, nondistended  Extr: moves all extremities well  Neuro: CN II-XII grossly intact w/o focal deficit  Integument: Normal turgor and tone. No jaundice.  Psych: appropriate affect, normal speech    Medical Decision Making:  Data reviewed during evaluation of Mr. Salcido includes lab results, personal interpretation of imaging results, and other practitioner's notes and charts prior to this encounter including review of the results from >2 unique tests.    Assessment:   Mr. Salcido is a 58 y.o. male who presents for follow-up 5 days after abdominal ultrasound. Overall, Mr. Salcido is doing well with mild symptoms, and negative abdominal ultrasound.     Plan:  -Over the counter pain relief PRN, symptomatic management  -Call clinic PRN with any new or worsening symptoms    Omar Interiano MD  Acute Care Surgery  [facility] of [facility]      "

## 2024-08-14 ENCOUNTER — OFFICE VISIT (OUTPATIENT)
Dept: NEUROLOGY | Facility: CLINIC | Age: 59
End: 2024-08-14
Payer: COMMERCIAL

## 2024-08-14 DIAGNOSIS — R41.89 COGNITIVE CHANGES: Primary | ICD-10-CM

## 2024-08-14 DIAGNOSIS — F33.1 MODERATE EPISODE OF RECURRENT MAJOR DEPRESSIVE DISORDER: ICD-10-CM

## 2024-08-14 PROCEDURE — 99499 UNLISTED E&M SERVICE: CPT | Mod: 95,,, | Performed by: CLINICAL NEUROPSYCHOLOGIST

## 2024-08-14 NOTE — PROGRESS NOTES
Neuropsychology Visit - Feedback (Telemedicine)    Referral Information  Name: John Salcido  MRN: [id number]  : 1965  Age: 58 y.o.  Billing: Charges for Neuropsychology Feedback billed on 2024  Telemedicine:   The patient location is: Home  The provider location is:  [facility]  The chief complaint leading to consultation/medical necessity is: Feedback session for results/treatment plan discussion  Visit type: Virtual visit with synchronous audio and video  Total time spent with patient: 53-min  Each patient to whom he or she provides medical services by telemedicine is:  (1) informed of the relationship between the physician and patient and the respective role of any other health care provider with respect to management of the patient; and (2) notified that he or she may decline to receive medical services by telemedicine and may withdraw from such care at any time.    Problem List Items Addressed This Visit          Neuro    Cognitive changes - Primary    Current Assessment & Plan     -Feedback session completed to discuss results and plan. Review Neuropsychology Consult dated for 2024 for complete details of feedback discussion, diagnosis, treatment plan.  -Additional concerns: None  -Follow-up: PRN              Psychiatric    Moderate episode of recurrent major depressive disorder    Current Assessment & Plan     -Feedback session completed to discuss results and plan. Review Neuropsychology Consult dated for 2024 for complete details of feedback discussion, diagnosis, treatment plan.  -Additional concerns: None  -Follow-up: Ref to psychiatry and he is scheduling with a therapist

## 2024-08-14 NOTE — ASSESSMENT & PLAN NOTE
-Feedback session completed to discuss results and plan. Review Neuropsychology Consult dated for 7/9/2024 for complete details of feedback discussion, diagnosis, treatment plan.  -Additional concerns: None  -Follow-up: Ref to psychiatry and he is scheduling with a therapist

## 2024-08-14 NOTE — ASSESSMENT & PLAN NOTE
-Feedback session completed to discuss results and plan. Review Neuropsychology Consult dated for 7/9/2024 for complete details of feedback discussion, diagnosis, treatment plan.  -Additional concerns: None  -Follow-up: PRN

## 2024-10-17 DIAGNOSIS — K76.0 METABOLIC DYSFUNCTION-ASSOCIATED STEATOTIC LIVER DISEASE (MASLD): Primary | ICD-10-CM

## 2024-11-19 ENCOUNTER — OFFICE VISIT (OUTPATIENT)
Dept: UROLOGY | Facility: CLINIC | Age: 59
End: 2024-11-19
Payer: COMMERCIAL

## 2024-11-19 VITALS
BODY MASS INDEX: 32.68 KG/M2 | SYSTOLIC BLOOD PRESSURE: 154 MMHG | DIASTOLIC BLOOD PRESSURE: 95 MMHG | WEIGHT: 215.63 LBS | HEIGHT: 68 IN | HEART RATE: 76 BPM

## 2024-11-19 DIAGNOSIS — N20.0 CALCULUS OF KIDNEY: ICD-10-CM

## 2024-11-19 DIAGNOSIS — R33.9 INCOMPLETE EMPTYING OF BLADDER: Primary | ICD-10-CM

## 2024-11-19 DIAGNOSIS — N20.1 CALCULUS OF URETER: ICD-10-CM

## 2024-11-19 PROCEDURE — 1160F RVW MEDS BY RX/DR IN RCRD: CPT | Mod: CPTII,S$GLB,, | Performed by: UROLOGY

## 2024-11-19 PROCEDURE — 99214 OFFICE O/P EST MOD 30 MIN: CPT | Mod: S$GLB,,, | Performed by: UROLOGY

## 2024-11-19 PROCEDURE — 3008F BODY MASS INDEX DOCD: CPT | Mod: CPTII,S$GLB,, | Performed by: UROLOGY

## 2024-11-19 PROCEDURE — 3077F SYST BP >= 140 MM HG: CPT | Mod: CPTII,S$GLB,, | Performed by: UROLOGY

## 2024-11-19 PROCEDURE — 3080F DIAST BP >= 90 MM HG: CPT | Mod: CPTII,S$GLB,, | Performed by: UROLOGY

## 2024-11-19 PROCEDURE — 99999 PR PBB SHADOW E&M-EST. PATIENT-LVL III: CPT | Mod: PBBFAC,,, | Performed by: UROLOGY

## 2024-11-19 PROCEDURE — 1159F MED LIST DOCD IN RCRD: CPT | Mod: CPTII,S$GLB,, | Performed by: UROLOGY

## 2024-11-19 RX ORDER — BETAMETHASONE DIPROPIONATE 0.5 MG/G
LOTION TOPICAL
COMMUNITY
Start: 2024-09-07

## 2024-11-19 RX ORDER — CLOTRIMAZOLE AND BETAMETHASONE DIPROPIONATE 10; .64 MG/G; MG/G
CREAM TOPICAL
Qty: 45 G | Refills: 0 | Status: SHIPPED | OUTPATIENT
Start: 2024-11-19

## 2024-11-19 NOTE — PROGRESS NOTES
CHIEF COMPLAINT:  Pain to penis       HISTORY OF PRESENTING ILLINESS:  John Salcido is a 59 y.o. male is an established patient at our clinic. He has a history of kidney stones. He states for the last 6 weeks he has been experiencing burning pain on and off to his penis. He reports it was more purple and swollen but the coloring has improved. Denies pain to scrotum. Denies any trauma or new sexual partners. Denies fever and chills. He reports dribbling at the end of urination and feeling like he does not completely empty his bladder. States he has taken Flomax in the past for kidney stones.     12/2023 ureteroscopy  Procedure(s) Performed:   1. Bilateral ureteroscopy with laser lithotripsy and stone basketing  2. Cystoscopy  3. Bilateral ureteral stent placement  4. Bilateral pyeloscopy  5. Fluoro < 1 hr  6. Rectal exam under anesthesia    100% Calcium oxalate monohydrate.     REVIEW OF SYSTEMS:  ROS      PATIENT HISTORY:    Past Medical History:   Diagnosis Date    High cholesterol     Hypertension     Kidney stones        Past Surgical History:   Procedure Laterality Date    CYSTOSCOPY N/A 12/13/2023    Procedure: CYSTOSCOPY;  Surgeon: Loli Castillo MD;  Location: [facility] OR [address] [place] [address];  Service: Urology;  Laterality: N/A;    CYSTOSCOPY W/ RETROGRADES Bilateral 9/13/2018    Procedure: CYSTOSCOPY WITH RETROGRADE PYELOGRAM;  Surgeon: Francisco Pena MD;  Location: [facility] OR;  Service: Urology;  Laterality: Bilateral;    EXTRACTION - STONE Bilateral 12/13/2023    Procedure: EXTRACTION - STONE;  Surgeon: Loli Castillo MD;  Location: [facility] OR [address] [place] [address];  Service: Urology;  Laterality: Bilateral;    LASER LITHOTRIPSY Left 9/13/2018    Procedure: LITHOTRIPSY-LASER;  Surgeon: Francisco Pena MD;  Location: [facility] OR;  Service: Urology;  Laterality: Left;    LASER LITHOTRIPSY Bilateral 12/13/2023    Procedure: LITHOTRIPSY, USING LASER;  Surgeon: Loli Castillo MD;  Location: [facility] OR [address] [place] [address];  Service:  Urology;  Laterality: Bilateral;    PLACEMENT-STENT Bilateral 12/13/2023    Procedure: PLACEMENT-STENT;  Surgeon: Loli Castillo MD;  [facility];  Service: Urology;  Laterality: Bilateral;    PYELOSCOPY Bilateral 12/13/2023    Procedure: PYELOSCOPY;  Surgeon: Loli Castillo MD;  Location: [facility] OR 1ST FLR;  Service: Urology;  Laterality: Bilateral;    URETERAL STENT PLACEMENT Left 9/13/2018    Procedure: INSERTION, STENT, URETER;  Surgeon: Francisco Pena MD;  Location: [facility] OR;  Service: Urology;  Laterality: Left;    URETEROSCOPIC REMOVAL OF URETERIC CALCULUS Left 9/13/2018    Procedure: EXTRACTION-STONE-URETEROSCOPY WITH BASKET;  Surgeon: Francisco Pena MD;  Location: [facility] OR;  Service: Urology;  Laterality: Left;    URETEROSCOPY Left 9/13/2018    Procedure: URETEROSCOPY;  Surgeon: Francisco Pena MD;  Location: [facility] OR;  Service: Urology;  Laterality: Left;    URETEROSCOPY Bilateral 12/13/2023    Procedure: URETEROSCOPY;  Surgeon: Loli Castillo MD;  [facility];  Service: Urology;  Laterality: Bilateral;       Family History   Problem Relation Name Age of Onset    Cirrhosis Father         Social History     Socioeconomic History    Marital status:    Tobacco Use    Smoking status: Former     Types: Cigarettes     Passive exposure: Past    Smokeless tobacco: Never   Substance and Sexual Activity    Alcohol use: Yes    Drug use: Never     Social Drivers of Health     Financial Resource Strain: High Risk (1/9/2024)    Overall Financial Resource Strain (CARDIA)     Difficulty of Paying Living Expenses: Very hard   Food Insecurity: Food Insecurity Present (1/9/2024)    Hunger Vital Sign     Worried About Running Out of Food in the Last Year: Often true     Ran Out of Food in the Last Year: Never true   Transportation Needs: No Transportation Needs (1/9/2024)    PRAPARE - Transportation     Lack of Transportation (Medical): No     Lack of  Transportation (Non-Medical): No   Physical Activity: Sufficiently Active (1/9/2024)    Exercise Vital Sign     Days of Exercise per Week: 3 days     Minutes of Exercise per Session: 60 min   Stress: Stress Concern Present (1/9/2024)    [employer] - Occupational Stress Questionnaire     Feeling of Stress : Very much   Housing Stability: Low Risk  (1/9/2024)    Housing Stability Vital Sign     Unable to Pay for Housing in the Last Year: No     Number of Places Lived in the Last Year: 1     Unstable Housing in the Last Year: No       Allergies:  Levonorgestrel-ethinyl estrad    Medications:    Current Outpatient Medications:     allopurinol (ZYLOPRIM) 300 MG tablet, Take 300 mg by mouth once daily., Disp: , Rfl:     betamethasone dipropionate (DIPROLENE) 0.05 % lotion, SMARTSIG:Sparingly Topical Twice Daily, Disp: , Rfl:     dexlansoprazole (DEXILANT) 60 mg capsule, Take 1 capsule by mouth every morning., Disp: , Rfl:     metoprolol succinate (TOPROL-XL) 50 MG 24 hr tablet, Take 50 mg by mouth 2 (two) times daily., Disp: , Rfl:     simvastatin (ZOCOR) 20 MG tablet, Take 20 mg by mouth once daily., Disp: , Rfl:     ursodioL (ACTIGALL) 300 mg capsule, Take 1 capsule (300 mg total) by mouth 2 (two) times daily., Disp: 60 capsule, Rfl: 11    PHYSICAL EXAMINATION:  Physical Exam  Constitutional:       Appearance: Normal appearance.   HENT:      Head: Normocephalic and atraumatic.   Eyes:      Pupils: Pupils are equal, round, and reactive to light.   Pulmonary:      Effort: Pulmonary effort is normal. No respiratory distress.   Genitourinary:     Penis: Normal.       Comments: Slight discoloration to head of penis  Skin:     General: Skin is warm and dry.   Neurological:      General: No focal deficit present.      Mental Status: He is alert and oriented to person, place, and time.           LABS:      In office UA today was normal.     A post void residual bladder scan was performed  immediately after voiding. The patient's PVR was noted to be 72cc.        Lab Results   Component Value Date    PSA 0.53 12/06/2023       Lab Results   Component Value Date    CREATININE 0.8 11/28/2023    EGFRNORACEVR >60.0 11/28/2023           IMPRESSION:    Encounter Diagnoses   Name Primary?    Calculus of kidney Yes    Calculus of ureter          Assessment:       1. Calculus of kidney    2. Calculus of ureter        Plan:       John was seen today for testicle pain.    Diagnoses and all orders for this visit:    Incomplete emptying of bladder  -     POCT Bladder Scan    Calculus of kidney    Calculus of ureter    Other orders  -     clotrimazole-betamethasone 1-0.05% (LOTRISONE) cream; Apply twice a day for 1 week to affected area, then stop x 1 week, then repeat.      F/u 3 weeks with NP.   Ultrasound follow up kidney stones.

## 2024-11-20 ENCOUNTER — HOSPITAL ENCOUNTER (OUTPATIENT)
Dept: RADIOLOGY | Facility: HOSPITAL | Age: 59
Discharge: HOME | End: 2024-11-20
Payer: COMMERCIAL

## 2024-11-20 DIAGNOSIS — N20.0 NEPHROLITHIASIS: ICD-10-CM

## 2024-11-20 PROCEDURE — 76770 US EXAM ABDO BACK WALL COMP: CPT | Mod: TC

## 2024-11-20 PROCEDURE — 76770 US EXAM ABDO BACK WALL COMP: CPT | Mod: 26,,, | Performed by: RADIOLOGY

## 2024-12-12 ENCOUNTER — TELEPHONE (OUTPATIENT)
Dept: ORTHOPEDICS | Facility: CLINIC | Age: 59
End: 2024-12-12
Payer: COMMERCIAL

## 2025-01-06 ENCOUNTER — TELEPHONE (OUTPATIENT)
Dept: UROLOGY | Facility: CLINIC | Age: 60
End: 2025-01-06
Payer: OTHER MISCELLANEOUS

## 2025-01-06 DIAGNOSIS — N20.0 CALCULUS OF KIDNEY: Primary | ICD-10-CM

## 2025-01-07 ENCOUNTER — PATIENT MESSAGE (OUTPATIENT)
Dept: UROLOGY | Facility: CLINIC | Age: 60
End: 2025-01-07
Payer: OTHER MISCELLANEOUS

## 2025-01-07 NOTE — TELEPHONE ENCOUNTER
----- Message from Med Assistant Strong sent at 12/26/2024 11:16 AM CST -----  Regarding: FW: PT ADVICE  Contact: [phone]  Results to the ultrasound. It was ordered by Dr. Rocha on 11/19/2024  ----- Message -----  From: Alis Baltazar  Sent: 12/26/2024  10:41 AM CST  To: Jonathan [facility]ERRE Staff  Subject: PT ADVICE                                        Pt is requesting a  call from someone in the office and is asking for a return call back soon. Thanks.     Reason for call:DISCUSS PLAN OF CARE AND RX      Patient's DX:     Patient requesting call back or [facility] msg:  [phone]

## 2025-01-21 ENCOUNTER — TELEPHONE (OUTPATIENT)
Dept: ORTHOPEDICS | Facility: CLINIC | Age: 60
End: 2025-01-21
Payer: OTHER MISCELLANEOUS

## 2025-01-21 NOTE — TELEPHONE ENCOUNTER
Spoke with pt regarding rescheduling appt due to inclement weather. Pt verbalized understanding and confirmed new appt date/time.

## 2025-02-04 ENCOUNTER — TELEPHONE (OUTPATIENT)
Dept: HEPATOLOGY | Facility: CLINIC | Age: 60
End: 2025-02-04
Payer: COMMERCIAL

## 2025-02-04 NOTE — TELEPHONE ENCOUNTER
----- Message from Kami sent at 2/4/2025 12:50 PM CST -----  Contact: Patient  Type: Patient Call          Who Called: Patient      Does the patient know what this is regarding? Pt is requesting a call back to reschedule his fibroscan and appt that was initially scheduled for 1/30. Please advise           Does the patient rather a call back or a response via [facility]? call        Best Call Back Number: [phone]         Additional Information:   12:00

## 2025-02-04 NOTE — TELEPHONE ENCOUNTER
Spoke with patient to reschedule his missed appointments in January. F/u was rescheduled for next available in May and fibroscan was scheduled to be done on 02/10/25 since he stated it would be better for him. Patient agreed to scheduled date and time. Mail letter was sent.

## 2025-02-10 ENCOUNTER — PROCEDURE VISIT (OUTPATIENT)
Dept: HEPATOLOGY | Facility: CLINIC | Age: 60
End: 2025-02-10
Payer: COMMERCIAL

## 2025-02-10 DIAGNOSIS — K76.0 METABOLIC DYSFUNCTION-ASSOCIATED STEATOTIC LIVER DISEASE (MASLD): ICD-10-CM

## 2025-02-10 PROCEDURE — 91200 LIVER ELASTOGRAPHY: CPT | Mod: S$GLB,,, | Performed by: NURSE PRACTITIONER

## 2025-02-11 ENCOUNTER — PATIENT MESSAGE (OUTPATIENT)
Dept: HEPATOLOGY | Facility: CLINIC | Age: 60
End: 2025-02-11
Payer: COMMERCIAL

## 2025-02-11 NOTE — PROCEDURES
FibroScan Transplant Hepatology(Vibration Controlled Transient Elastography)    Date/Time: 2/10/2025 4:00 PM    Performed by: Phyllis Toscano NP  Authorized by: Larisa Buitrago NP    Diagnosis:  NAFLD    Probe:  M    Universal Protocol: Patient's identity, procedure and site were verified, confirmatory pause was performed.  Discussed procedure including risks and potential complications.  Questions answered.  Patient verbalizes understanding and wishes to proceed with VCTE.     Procedure: After providing explanations of the procedure, patient was placed in the supine position with right arm in maximum abduction to allow optimal exposure of right lateral abdomen.  Patient was briefly assessed, Testing was performed in the mid-axillary location, 50Hz Shear Wave pulses were applied and the resulting Shear Wave and Propagation Speed detected with a 3.5 MHz ultrasonic signal, using the FibroScan probe, Skin to liver capsule distance and liver parenchyma were accessed during the entire examination with the FibroScan probe, Patient was instructed to breathe normally and to abstain from sudden movements during the procedure, allowing for random measurements of liver stiffness. At least 10 Shear Waves were produced, Individual measurements of each Shear Wave were calculated.  Patient tolerated the procedure well with no complications.  Meets discharge criteria as was dismissed.  Rates pain 0 out of 10.  Patient will follow up with ordering provider to review results.    Findings  Median liver stiffness score:  8.3  CAP Reading: dB/m:  369    IQR/med %:  8  Interpretation  Fibrosis interpretation is based on medial liver stiffness - Kilopascal (kPa).    Fibrosis Stage:  F2  Steatosis interpretation is based on controlled attenuation parameter - (dB/m).    Steatosis Grade:  S3

## 2025-02-13 DIAGNOSIS — K80.20 GALLSTONES: Primary | ICD-10-CM

## 2025-02-13 RX ORDER — URSODIOL 300 MG/1
300 CAPSULE ORAL 2 TIMES DAILY
Qty: 60 CAPSULE | Refills: 11 | Status: SHIPPED | OUTPATIENT
Start: 2025-02-13 | End: 2026-02-13

## 2025-02-25 DIAGNOSIS — M51.362 DEGENERATION OF INTERVERTEBRAL DISC OF LUMBAR REGION WITH DISCOGENIC BACK PAIN AND LOWER EXTREMITY PAIN: Primary | ICD-10-CM

## 2025-02-25 DIAGNOSIS — M54.9 MID BACK PAIN: ICD-10-CM

## 2025-02-25 DIAGNOSIS — M51.360 DEGENERATION OF INTERVERTEBRAL DISC OF LUMBAR REGION WITH DISCOGENIC BACK PAIN: Primary | ICD-10-CM

## 2025-02-26 ENCOUNTER — HOSPITAL ENCOUNTER (OUTPATIENT)
Dept: RADIOLOGY | Facility: HOSPITAL | Age: 60
Discharge: HOME | End: 2025-02-26
Attending: ORTHOPAEDIC SURGERY
Payer: COMMERCIAL

## 2025-02-26 ENCOUNTER — OFFICE VISIT (OUTPATIENT)
Dept: ORTHOPEDICS | Facility: CLINIC | Age: 60
End: 2025-02-26
Payer: COMMERCIAL

## 2025-02-26 VITALS — HEIGHT: 68 IN | BODY MASS INDEX: 32.77 KG/M2 | WEIGHT: 216.25 LBS

## 2025-02-26 DIAGNOSIS — M54.9 MID BACK PAIN: ICD-10-CM

## 2025-02-26 DIAGNOSIS — M51.362 DEGENERATION OF INTERVERTEBRAL DISC OF LUMBAR REGION WITH DISCOGENIC BACK PAIN AND LOWER EXTREMITY PAIN: ICD-10-CM

## 2025-02-26 DIAGNOSIS — M51.360 DEGENERATION OF INTERVERTEBRAL DISC OF LUMBAR REGION WITH DISCOGENIC BACK PAIN: ICD-10-CM

## 2025-02-26 DIAGNOSIS — M47.816 LUMBAR SPONDYLOSIS: ICD-10-CM

## 2025-02-26 DIAGNOSIS — M54.9 DORSALGIA, UNSPECIFIED: ICD-10-CM

## 2025-02-26 DIAGNOSIS — M48.07 SPINAL STENOSIS, LUMBOSACRAL REGION: ICD-10-CM

## 2025-02-26 DIAGNOSIS — M54.16 LUMBAR RADICULOPATHY: ICD-10-CM

## 2025-02-26 DIAGNOSIS — M43.10 DEGENERATIVE SPONDYLOLISTHESIS: Primary | ICD-10-CM

## 2025-02-26 PROCEDURE — 72110 X-RAY EXAM L-2 SPINE 4/>VWS: CPT | Mod: 26,,, | Performed by: RADIOLOGY

## 2025-02-26 PROCEDURE — 72110 X-RAY EXAM L-2 SPINE 4/>VWS: CPT | Mod: TC

## 2025-02-26 PROCEDURE — 72070 X-RAY EXAM THORAC SPINE 2VWS: CPT | Mod: TC

## 2025-02-26 PROCEDURE — 99999 PR PBB SHADOW E&M-EST. PATIENT-LVL III: CPT | Mod: PBBFAC,,, | Performed by: ORTHOPAEDIC SURGERY

## 2025-02-26 PROCEDURE — 72070 X-RAY EXAM THORAC SPINE 2VWS: CPT | Mod: 26,,, | Performed by: INTERNAL MEDICINE

## 2025-02-26 NOTE — PROGRESS NOTES
DATE: 2/27/2025  PATIENT: John Salcido    Attending Physician: Josh Peterson M.D.    CHIEF COMPLAINT: neck and RUE pain; LBP and BLE pain    HISTORY:  John Salcido is a 59 y.o. male w/ a hx of b/l CTS presents for initial evaluation of neck and right arm pain (Neck - 5, Arm - 5). The pain has been present for years. The patient describes the pain as dull and it radiates down RUE to the hand. The pain is worse with activity and improved by rest. There is BUE associated numbness and tingling. There is no subjective weakness. Prior treatments have included OTC meds, PT, but no AROLDO or surgery.     Patient also complained of years history of LBP that radiates anterolaterally down BLE to the knees. He has BLE n/t and weakness. He cannot walk longer than 1 block before having to stop to rest; he gets relief by leaning forward on a shopping cart.    The Patient denies myelopathic symptoms such as handwriting changes or difficulty with buttons/coins/keys. Denies perineal paresthesias, bowel/bladder dysfunction.    The patient does not smoke, have DM or endorse IVDU. The patient is not on any blood thinners and does not take chronic narcotics. 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    PAST MEDICAL/SURGICAL HISTORY:  Past Medical History:   Diagnosis Date    High cholesterol     Hypertension     Kidney stones      Past Surgical History:   Procedure Laterality Date    CYSTOSCOPY N/A 12/13/2023    Procedure: CYSTOSCOPY;  Surgeon: Loli Castillo MD;  Location: [facility] OR [address] [place] [address];  Service: Urology;  Laterality: N/A;    CYSTOSCOPY W/ RETROGRADES Bilateral 9/13/2018    Procedure: CYSTOSCOPY WITH RETROGRADE PYELOGRAM;  Surgeon: Francisco Pena MD;  Location: [facility];  Service: Urology;  Laterality: Bilateral;    EXTRACTION - STONE Bilateral 12/13/2023    Procedure: EXTRACTION - STONE;  Surgeon: Loli Castillo MD;  Location: [facility] OR [address] [place] [address];  Service: Urology;  Laterality: Bilateral;    LASER LITHOTRIPSY Left  9/13/2018    Procedure: LITHOTRIPSY-LASER;  Surgeon: Francisco Pena MD;  Location: [facility] OR;  Service: Urology;  Laterality: Left;    LASER LITHOTRIPSY Bilateral 12/13/2023    Procedure: LITHOTRIPSY, USING LASER;  Surgeon: Loli Castillo MD;  [facility];  Service: Urology;  Laterality: Bilateral;    PLACEMENT-STENT Bilateral 12/13/2023    Procedure: PLACEMENT-STENT;  Surgeon: Loli Castillo MD;  [facility];  Service: Urology;  Laterality: Bilateral;    PYELOSCOPY Bilateral 12/13/2023    Procedure: PYELOSCOPY;  Surgeon: Loli Castillo MD;  [facility];  Service: Urology;  Laterality: Bilateral;    URETERAL STENT PLACEMENT Left 9/13/2018    Procedure: INSERTION, STENT, URETER;  Surgeon: Francisco Pena MD;  Location: [facility] OR;  Service: Urology;  Laterality: Left;    URETEROSCOPIC REMOVAL OF URETERIC CALCULUS Left 9/13/2018    Procedure: EXTRACTION-STONE-URETEROSCOPY WITH BASKET;  Surgeon: Francisco Pena MD;  Location: [facility] OR;  Service: Urology;  Laterality: Left;    URETEROSCOPY Left 9/13/2018    Procedure: URETEROSCOPY;  Surgeon: Francisco Pena MD;  Location: [facility] OR;  Service: Urology;  Laterality: Left;    URETEROSCOPY Bilateral 12/13/2023    Procedure: URETEROSCOPY;  Surgeon: Loli Castillo MD;  Location: [facility] OR [address] [place] [address];  Service: Urology;  Laterality: Bilateral;       Current Medications: Current Medications[1]    Social History: Social History[2]    REVIEW OF SYSTEMS:  Constitution: Negative. Negative for chills, fever and night sweats.   Cardiovascular: Negative for chest pain and syncope.   Respiratory: Negative for cough and shortness of breath.   Gastrointestinal: See HPI. Negative for nausea/vomiting. Negative for abdominal pain.  Genitourinary: See HPI. Negative for discoloration or dysuria.  Skin: Negative for dry skin, itching and rash.   Hematologic/Lymphatic: negative for bleeding/clotting disorders.  "  Musculoskeletal: Negative for falls and muscle weakness.   Neurological: See HPI. no history of seizures. no history of cranial surgery or shunts.  Endocrine: Negative for polydipsia, polyphagia and polyuria.   Allergic/Immunologic: Negative for hives and persistent infections.  Psychiatric/Behavioral: Negative for depression and insomnia.         EXAM:  Ht 5' 8" (1.727 m)   Wt 98.1 kg (216 lb 4.3 oz)   BMI 32.88 kg/m²     General: The patient is a 59 y.o. male in no apparent distress, the patient is orientatied to person, place and time.  Psych: Normal mood and affect  HEENT: Vision grossly intact, hearing intact to the spoken word.  Lungs: Respirations unlabored.  Gait: Normal station and gait, no difficulty with toe or heel walk.   Skin: Cervical skin negative for rashes, lesions, hairy patches and surgical scars.  Range of motion: Cervical range of motion is acceptable. There is lower lumbar and posterior cervical tenderness to palpation.  Spinal Balance: Global saggital and coronal spinal balance acceptable, no significant for scoliosis and kyphosis.  Musculoskeletal: No pain with the range of motion of the bilateral shoulders and elbows. Normal bulk and contour of the bilateral hands.  Vascular: Bilateral hands warm and well perfused, Radial pulses 2+ bilaterally.  Neurological: Normal strength and tone in all major motor groups in the bilateral upper and lower extremities. Normal sensation to light touch in the C5-T1 and L2-S1 dermatomes bilaterally.  Deep tendon reflexes symmetric 2+ in the bilateral upper and lower extremities.  Negative Inverted Radial Reflex and Vaughn's bilaterally. Negative Babinski bilaterally.     IMAGING:   Today I independently reviewed the following images and my interpretations are as follows:    AP, Lat and Flex/Ex  upright C-spine films demonstrate spondylosis and DDD.    MRI cervical from 2023 showed no significant stenosis.    Lumbar Xrs showed spondylosis and DDD. L4-5 " anterolisthesis measuring 6mm.    MRI lumbar from 2023 showed mod L4-5 central and mod-severe b/l foraminal stenosis.     Body mass index is 32.88 kg/m².  Hemoglobin A1C   Date Value Ref Range Status   11/16/2023 5.4 4.0 - 5.6 % Final     Comment:     ADA Screening Guidelines:  5.7-6.4%  Consistent with prediabetes  >or=6.5%  Consistent with diabetes    High levels of fetal hemoglobin interfere with the HbA1C  assay. Heterozygous hemoglobin variants (HbS, HgC, etc)do  not significantly interfere with this assay.   However, presence of multiple variants may affect accuracy.         ASSESSMENT/PLAN:  Degenerative spondylolisthesis    Lumbar spondylosis    Degeneration of intervertebral disc of lumbar region with discogenic back pain and lower extremity pain    Lumbar radiculopathy    Dorsalgia, unspecified  -     MRI Lumbar Spine Without Contrast; Future; Expected date: 02/26/2025    Spinal stenosis, lumbosacral region  -     CT Lumbar Spine Without Contrast; Future; Expected date: 02/26/2025      Follow up in about 4 weeks (around 3/26/2025).    Patient has lumbar degenerative spondylolisthesis and stenosis with neurogenic claudication. I discussed the natural history of their diagnoses as well as surgical and nonsurgical treatment options. I educated the patient on the importance of core/back strengthening, correct posture, bending/lifting ergonomics, and low-impact aerobic exercises (walking, elliptical, and aquatherapy). Continue medications. Patient has failed conservative management and is a candidate for L4-5 PLDF/TLIF (L). I ordered updated lumbar MRI and CT. He will RTC for preop.    He also has cervical spondylosis. However, MRI is not impressive; no ortho spine surgical intervention warranted.    Josh Peterson MD  Orthopaedic Spine Surgeon  Department of Orthopaedic Surgery  [phone]         [1]   Current Outpatient Medications:     allopurinol (ZYLOPRIM) 300 MG tablet, Take 300 mg by mouth once daily.,  Disp: , Rfl:     betamethasone dipropionate (DIPROLENE) 0.05 % lotion, SMARTSIG:Sparingly Topical Twice Daily, Disp: , Rfl:     clotrimazole-betamethasone 1-0.05% (LOTRISONE) cream, Apply twice a day for 1 week to affected area, then stop x 1 week, then repeat., Disp: 45 g, Rfl: 0    dexlansoprazole (DEXILANT) 60 mg capsule, Take 1 capsule by mouth every morning., Disp: , Rfl:     metoprolol succinate (TOPROL-XL) 50 MG 24 hr tablet, Take 50 mg by mouth 2 (two) times daily., Disp: , Rfl:     simvastatin (ZOCOR) 20 MG tablet, Take 20 mg by mouth once daily., Disp: , Rfl:     ursodioL (ACTIGALL) 300 mg capsule, Take 1 capsule (300 mg total) by mouth 2 (two) times daily., Disp: 60 capsule, Rfl: 11  [2]   Social History  Socioeconomic History    Marital status:    Tobacco Use    Smoking status: Former     Types: Cigarettes     Passive exposure: Past    Smokeless tobacco: Never   Substance and Sexual Activity    Alcohol use: Yes    Drug use: Never     Social Drivers of Health     Financial Resource Strain: Medium Risk (2/19/2025)    Overall Financial Resource Strain (CARDIA)     Difficulty of Paying Living Expenses: Somewhat hard   Food Insecurity: No Food Insecurity (2/19/2025)    Hunger Vital Sign     Worried About Running Out of Food in the Last Year: Never true     Ran Out of Food in the Last Year: Never true   Recent Concern: Food Insecurity - Food Insecurity Present (2/4/2025)    Hunger Vital Sign     Worried About Running Out of Food in the Last Year: Sometimes true     Ran Out of Food in the Last Year: Never true   Transportation Needs: No Transportation Needs (2/19/2025)    PRAPARE - Transportation     Lack of Transportation (Medical): No     Lack of Transportation (Non-Medical): No   Physical Activity: Inactive (2/19/2025)    Exercise Vital Sign     Days of Exercise per Week: 0 days     Minutes of Exercise per Session: 0 min   Stress: Stress Concern Present (2/19/2025)    [employer] - Occupational Stress Questionnaire     Feeling of Stress : Very much   Housing Stability: Low Risk  (2/19/2025)    Housing Stability Vital Sign     Unable to Pay for Housing in the Last Year: No     Number of Times Moved in the Last Year: 0     Homeless in the Last Year: No

## 2025-04-09 ENCOUNTER — OFFICE VISIT (OUTPATIENT)
Dept: ORTHOPEDICS | Facility: CLINIC | Age: 60
End: 2025-04-09
Attending: ORTHOPAEDIC SURGERY
Payer: COMMERCIAL

## 2025-04-09 VITALS — HEIGHT: 68 IN | BODY MASS INDEX: 32.77 KG/M2 | WEIGHT: 216.25 LBS

## 2025-04-09 DIAGNOSIS — M43.10 DEGENERATIVE SPONDYLOLISTHESIS: ICD-10-CM

## 2025-04-09 DIAGNOSIS — M54.16 LUMBAR RADICULOPATHY: ICD-10-CM

## 2025-04-09 DIAGNOSIS — M47.816 LUMBAR SPONDYLOSIS: Primary | ICD-10-CM

## 2025-04-09 DIAGNOSIS — M51.362 DEGENERATION OF INTERVERTEBRAL DISC OF LUMBAR REGION WITH DISCOGENIC BACK PAIN AND LOWER EXTREMITY PAIN: ICD-10-CM

## 2025-04-09 PROCEDURE — 99214 OFFICE O/P EST MOD 30 MIN: CPT | Mod: S$GLB,,, | Performed by: ORTHOPAEDIC SURGERY

## 2025-04-09 PROCEDURE — 99999 PR PBB SHADOW E&M-EST. PATIENT-LVL III: CPT | Mod: PBBFAC,,, | Performed by: ORTHOPAEDIC SURGERY

## 2025-04-09 PROCEDURE — 3008F BODY MASS INDEX DOCD: CPT | Mod: CPTII,S$GLB,, | Performed by: ORTHOPAEDIC SURGERY

## 2025-04-09 PROCEDURE — 1159F MED LIST DOCD IN RCRD: CPT | Mod: CPTII,S$GLB,, | Performed by: ORTHOPAEDIC SURGERY

## 2025-04-09 PROCEDURE — 1160F RVW MEDS BY RX/DR IN RCRD: CPT | Mod: CPTII,S$GLB,, | Performed by: ORTHOPAEDIC SURGERY

## 2025-05-29 ENCOUNTER — OFFICE VISIT (OUTPATIENT)
Dept: HEPATOLOGY | Facility: CLINIC | Age: 60
End: 2025-05-29
Payer: COMMERCIAL

## 2025-05-29 ENCOUNTER — LAB VISIT (OUTPATIENT)
Dept: LAB | Facility: HOSPITAL | Age: 60
End: 2025-05-29
Payer: COMMERCIAL

## 2025-05-29 ENCOUNTER — RESULTS FOLLOW-UP (OUTPATIENT)
Dept: HEPATOLOGY | Facility: CLINIC | Age: 60
End: 2025-05-29

## 2025-05-29 VITALS — HEIGHT: 68 IN | BODY MASS INDEX: 32.07 KG/M2 | WEIGHT: 211.63 LBS

## 2025-05-29 DIAGNOSIS — E78.5 DYSLIPIDEMIA: ICD-10-CM

## 2025-05-29 DIAGNOSIS — K76.0 METABOLIC DYSFUNCTION-ASSOCIATED STEATOTIC LIVER DISEASE (MASLD): ICD-10-CM

## 2025-05-29 DIAGNOSIS — K74.01 EARLY HEPATIC FIBROSIS: ICD-10-CM

## 2025-05-29 DIAGNOSIS — E66.811 OBESITY (BMI 30.0-34.9): ICD-10-CM

## 2025-05-29 DIAGNOSIS — K76.0 METABOLIC DYSFUNCTION-ASSOCIATED STEATOTIC LIVER DISEASE (MASLD): Primary | ICD-10-CM

## 2025-05-29 DIAGNOSIS — K80.20 GALLSTONES: ICD-10-CM

## 2025-05-29 DIAGNOSIS — R74.8 ELEVATED LIVER ENZYMES: ICD-10-CM

## 2025-05-29 LAB
ALBUMIN SERPL BCP-MCNC: 4.5 G/DL (ref 3.5–5.2)
ALP SERPL-CCNC: 73 UNIT/L (ref 40–150)
ALT SERPL W/O P-5'-P-CCNC: 32 UNIT/L (ref 10–44)
AST SERPL-CCNC: 28 UNIT/L (ref 11–45)
BILIRUB DIRECT SERPL-MCNC: 0.5 MG/DL (ref 0.1–0.3)
BILIRUB SERPL-MCNC: 1.8 MG/DL (ref 0.1–1)
CHOLEST SERPL-MCNC: 220 MG/DL (ref 120–199)
CHOLEST/HDLC SERPL: 4.1 {RATIO} (ref 2–5)
EAG (OHS): 117 MG/DL (ref 68–131)
HBA1C MFR BLD: 5.7 % (ref 4–5.6)
HDLC SERPL-MCNC: 54 MG/DL (ref 40–75)
HDLC SERPL: 24.5 % (ref 20–50)
LDLC SERPL CALC-MCNC: 141 MG/DL (ref 63–159)
NONHDLC SERPL-MCNC: 166 MG/DL
PROT SERPL-MCNC: 7 GM/DL (ref 6–8.4)
TRIGL SERPL-MCNC: 125 MG/DL (ref 30–150)
TSH SERPL-ACNC: 1.43 UIU/ML (ref 0.4–4)

## 2025-05-29 PROCEDURE — 1159F MED LIST DOCD IN RCRD: CPT | Mod: CPTII,S$GLB,, | Performed by: NURSE PRACTITIONER

## 2025-05-29 PROCEDURE — 82040 ASSAY OF SERUM ALBUMIN: CPT

## 2025-05-29 PROCEDURE — 83036 HEMOGLOBIN GLYCOSYLATED A1C: CPT

## 2025-05-29 PROCEDURE — 99999 PR PBB SHADOW E&M-EST. PATIENT-LVL III: CPT | Mod: PBBFAC,,, | Performed by: NURSE PRACTITIONER

## 2025-05-29 PROCEDURE — 99214 OFFICE O/P EST MOD 30 MIN: CPT | Mod: S$GLB,,, | Performed by: NURSE PRACTITIONER

## 2025-05-29 PROCEDURE — 3008F BODY MASS INDEX DOCD: CPT | Mod: CPTII,S$GLB,, | Performed by: NURSE PRACTITIONER

## 2025-05-29 PROCEDURE — 36415 COLL VENOUS BLD VENIPUNCTURE: CPT

## 2025-05-29 PROCEDURE — 80061 LIPID PANEL: CPT

## 2025-05-29 PROCEDURE — 84443 ASSAY THYROID STIM HORMONE: CPT

## 2025-05-29 RX ORDER — NYSTATIN 100000 [USP'U]/ML
15 SUSPENSION ORAL 4 TIMES DAILY
COMMUNITY
Start: 2025-05-22

## 2025-05-29 RX ORDER — KETOCONAZOLE 20 MG/G
CREAM TOPICAL
COMMUNITY
Start: 2024-12-10

## 2025-05-29 NOTE — PROGRESS NOTES
[facility] - Established Patient     Chief Complaint: Follow-up for fatty liver.    HISTORY     This is a 59 y.o. male with PMH noted below, here for follow-up of fatty liver.     He was first told about having fatty liver in his 20s or 30s.   This was noted on CT in our system 10/24/23; no findings to suggest advanced fibrosis.     Risk factors for fatty liver include:   BMI >30  HTN, HLD  No DM  No heavy alcohol use     His transaminases have been elevated since at least 2018, ALT>AST, <100. TBili also chronically elevated though mainly indirect and <2 (Gilbert's suspected). Platelets low end of normal, 150-160s.    Serologic workup has been negative for hemochromatosis and viral hepatitis.    Fibrosis staging:   Fibroscan 11/2023 = F0-F1, S3    Interval history:  He was last seen in clinic by Larisa Buitrago NP in 1/2024. Feels well, no new concerns from liver standpoint.   Fibroscan to non-invasively restage liver disease in 2/2025 was worsened, and was again suggestive of severe fatty infiltration of the liver (S3), with F2 fibrosis (moderate scarring).    No symptoms of hepatic decompensation including jaundice, ascites, cognitive problems to suggest hepatic encephalopathy, or GI bleeding.     Updates on risk factors for fatty liver:  Weight -- BMI 32.18  Weight is down 9 lbs since last visit.              Dyslipidemia -- no recent lipid panel for review  Previously on statin                                 Insulin resistance / diabetes -- no          Alcohol use -- 6 pack per month.    Health Maintenance:  - Most recent imaging: abd US 12/2023 with 0.9 cm liver cyst, no concerning liver lesions  - Hepatitis A & B vaccination: needs vaccines    Past medical history, surgical history, problem list, family history, social history, allergies: Reviewed and updated in the appropriate section of the electronic medical record.    Current Outpatient Medications   Medication Sig Dispense Refill     ketoconazole (NIZORAL) 2 % cream SMARTSIG:sparingly Topical Twice Daily      nystatin (MYCOSTATIN) 100,000 unit/mL suspension Take 15 mLs by mouth 4 (four) times daily.      allopurinol (ZYLOPRIM) 300 MG tablet Take 300 mg by mouth once daily.      betamethasone dipropionate (DIPROLENE) 0.05 % lotion SMARTSIG:Sparingly Topical Twice Daily      clotrimazole-betamethasone 1-0.05% (LOTRISONE) cream Apply twice a day for 1 week to affected area, then stop x 1 week, then repeat. 45 g 0    dexlansoprazole (DEXILANT) 60 mg capsule Take 1 capsule by mouth every morning.      metoprolol succinate (TOPROL-XL) 50 MG 24 hr tablet Take 50 mg by mouth 2 (two) times daily.      simvastatin (ZOCOR) 20 MG tablet Take 20 mg by mouth once daily. (Patient not taking: Reported on 5/29/2025)      ursodioL (ACTIGALL) 300 mg capsule Take 1 capsule (300 mg total) by mouth 2 (two) times daily. 60 capsule 11     No current facility-administered medications for this visit.     Medication list reviewed and updated.    Review of Systems - as per HPI  Constitutional: Negative for unexpected weight change.   Respiratory: Negative for shortness of breath.    Cardiovascular: Negative for leg swelling.  Gastrointestinal: Negative for abdominal distention or abdominal pain. Negative for melena or hematemesis.  Musculoskeletal: Negative for myalgias.    Skin: Negative for jaundice or itching.  Neurological: Negative for confusion or slowed mentation. Negative for tremors.   Hematological: Does not bruise/bleed easily.     Physical Exam   Constitutional: No distress. Alert and oriented.  Pulmonary/Chest: No respiratory distress.   Skin: No jaundice.   Psychiatric: Normal mood and affect. Speech, behavior, and thought content normal.    LABS & DIAGNOSTIC STUDIES     I have personally reviewed pertinent laboratory findings:    Lab Results   Component Value Date    ALT 89 (H) 11/16/2023    AST 52 (H) 11/16/2023    ALKPHOS 84 11/16/2023    BILITOT 1.7 (H)  11/16/2023    ALBUMIN 4.4 11/16/2023    INR 1.0 12/11/2023     Lab Results   Component Value Date    WBC 5.37 11/16/2023    HGB 16.3 11/16/2023    HCT 46.2 11/16/2023    MCV 87 11/16/2023     11/16/2023     Lab Results   Component Value Date     10/24/2023    K 4.4 10/24/2023    BUN 12 10/24/2023    CREATININE 0.8 11/28/2023    ESTGFRAFRICA >60 09/12/2018    EGFRNONAA >60 09/12/2018     Lab Results   Component Value Date    FERRITIN 163 11/16/2023    FESATURATED 38 11/16/2023    HEPBSAG Non-reactive 11/16/2023    HEPBCAB Non-reactive 11/16/2023    HEPCAB Non-reactive 11/16/2023     I have personally reviewed the following result reports:  Abdominal US - 12/2023 and 7/2024  CT - 10/2023    ASSESSMENT & PLAN     59 y.o. male with:    1. Metabolic dysfunction-associated steatotic liver disease (MASLD)    2. Early hepatic fibrosis    3. Elevated liver enzymes    4. Obesity (BMI 30.0-34.9)    5. Dyslipidemia    6. Gallstones      - Schedule Fibroscan to non-invasively re-stage liver disease in 6 months.  - Recommend an Ultrasound of the liver every 1-2 years.  - Repeat liver function tests now.  - Recommend referral to nutritionist to discuss dietary changes.   - Recommend weight loss goal of 20 lbs, through diet and exercise.  - Recommend good control of cholesterol, blood pressure, & blood sugar levels.  - Avoid alcohol and herbal supplements/alternative remedies.  - Return to clinic in 6 months.      Thank you for allowing me to participate in the care of John Salcido       Hepatology Nurse Practitioner  [facility] [place] [facility]

## (undated) DEVICE — TRAY CYSTO BASIN OMC

## (undated) DEVICE — ADHESIVE SURG LIQ 2 OZ

## (undated) DEVICE — UNDERGLOVES BIOGEL PI SZ 7 LF

## (undated) DEVICE — UNDERGLOVE BIOGEL PI SZ 6.5 LF

## (undated) DEVICE — SYR 10CC LUER LOCK

## (undated) DEVICE — SHEATH FLEXOR URETERAL 45CM

## (undated) DEVICE — ADAPTER HOSE 10FT 8MM

## (undated) DEVICE — GUIDE WIRE MOTION .035 X 150CM

## (undated) DEVICE — DRESSING TRANS 2X2 TEGADERM

## (undated) DEVICE — UNDERGLOVES BIOGEL PI SIZE 7.5

## (undated) DEVICE — SOL NACL IRR 3000ML

## (undated) DEVICE — SOL IRRI STRL WATER 1000ML

## (undated) DEVICE — FIBER QUANTA OPT SDT 272UM

## (undated) DEVICE — PACK CYSTOSCOPY III SIRUS

## (undated) DEVICE — GUIDEWIRE STR TIP HIWIRE 150CM

## (undated) DEVICE — EXTRACTOR TIPLESS 2.4FRX1115CM